# Patient Record
Sex: FEMALE | Race: OTHER | HISPANIC OR LATINO | Employment: FULL TIME | URBAN - NONMETROPOLITAN AREA
[De-identification: names, ages, dates, MRNs, and addresses within clinical notes are randomized per-mention and may not be internally consistent; named-entity substitution may affect disease eponyms.]

---

## 2017-01-16 ENCOUNTER — OFFICE VISIT (OUTPATIENT)
Dept: URGENT CARE | Facility: PHYSICIAN GROUP | Age: 32
End: 2017-01-16
Payer: COMMERCIAL

## 2017-01-16 VITALS
TEMPERATURE: 97.2 F | HEART RATE: 88 BPM | WEIGHT: 240 LBS | BODY MASS INDEX: 42.52 KG/M2 | RESPIRATION RATE: 16 BRPM | SYSTOLIC BLOOD PRESSURE: 122 MMHG | DIASTOLIC BLOOD PRESSURE: 84 MMHG | OXYGEN SATURATION: 96 %

## 2017-01-16 DIAGNOSIS — J22 LOWER RESPIRATORY INFECTION: ICD-10-CM

## 2017-01-16 PROCEDURE — 99214 OFFICE O/P EST MOD 30 MIN: CPT | Performed by: PHYSICIAN ASSISTANT

## 2017-01-16 RX ORDER — CODEINE PHOSPHATE AND GUAIFENESIN 10; 100 MG/5ML; MG/5ML
5 SOLUTION ORAL NIGHTLY PRN
Qty: 120 ML | Refills: 0 | Status: SHIPPED | OUTPATIENT
Start: 2017-01-16 | End: 2021-09-20

## 2017-01-16 NOTE — PROGRESS NOTES
Chief Complaint   Patient presents with   • Other       HISTORY OF PRESENT ILLNESS: Patient is a 31 y.o. female who presents today for evaluation of the followin days h/o nonproductive cough  C/o intermittent chills/sweats, mostly at night  Difficulty sleeping d/t cough  Denies SOB, nasal congestion, ear pain, sore throat, N/V  Ibuprofen seems to make her feel a little better  Exertion makes cough worse    There are no active problems to display for this patient.      Allergies:Review of patient's allergies indicates no known allergies.    Current Outpatient Prescriptions Ordered in [facility]   Medication Sig Dispense Refill   • guaifenesin-codeine (ROBITUSSIN AC) Solution oral solution Take 5 mL by mouth at bedtime as needed for Cough. 120 mL 0   • ondansetron (ZOFRAN) 8 MG Tab Take 1 Tab by mouth every 8 hours as needed for Nausea/Vomiting. 15 Tab 0   • Hydrocortisone Acetate 1 % Ointment Eliane to affected area bid 1 Tube 0   • ondansetron (ZOFRAN) 4 MG TABS tablet Take 1 Tab by mouth every 8 hours as needed for Nausea/Vomiting. 20 Tab 1   • LISINOPRIL PO Take  by mouth.     • naproxen (NAPROSYN) 500 MG TABS Take 1 Tab by mouth 2 times a day, with meals. 60 Tab 0   • clotrimazole (LOTRIMIN AF) 1 % CREA AppliyTwice a day for 2 weeks 1 Tube 0   • cyclobenzaprine (FLEXERIL) 10 MG TABS Take  by mouth as needed for Mild Pain.     • naproxen (NAPROSYN) 500 MG TABS Take  by mouth as needed.       No current Epic-ordered facility-administered medications on file.       No past medical history on file.    Social History   Substance Use Topics   • Smoking status: Current Every Day Smoker   • Smokeless tobacco: Not on file      Comment:  ppd   • Alcohol Use: Yes       No family status information on file.   No family history on file.    ROS:   Review of Systems   Constitutional: Negative for  weight loss and malaise/fatigue.   HENT: Negative for ear pain, nosebleeds, congestion, sore throat and neck pain.    Eyes:  Negative for blurred vision.   Respiratory: Negative for sputum production, shortness of breath and wheezing.    Cardiovascular: Negative for chest pain, palpitations, orthopnea and leg swelling.   Gastrointestinal: Negative for heartburn, nausea, vomiting and abdominal pain.   Genitourinary: Negative for dysuria, urgency and frequency.       Exam:  Blood pressure 122/84, pulse 88, temperature 36.2 °C (97.2 °F), resp. rate 16, weight 108.863 kg (240 lb), SpO2 96 %.  General: Normal appearing. No distress.  HEENT: Conjunctiva clear, lids without ptosis, ears normal shape and contour, canals are clear bilaterally, tympanic membranes are benign, nasal mucosa benign, oropharynx is without erythema, edema or exudates.  Pulmonary: Clear to ausculation and percussion.  Normal effort. No rales, ronchi, or wheezing.   Cardiovascular: Regular rate and rhythm without murmur.   Neurologic: Grossly nonfocal.  Lymph: No cervical lymphadenopathy noted.  Skin: No obvious lesions.  Psych: Normal mood. Alert and oriented x3. Judgment and insight is normal.    Assessment/Plan:  Discussed likely viral etiology. Discussed appropriate over-the-counter symptomatic medication, and when to return to clinic. Take all medication as directed. Follow up for worsening or persistent symptoms.  1. Lower respiratory infection  guaifenesin-codeine (ROBITUSSIN AC) Solution oral solution

## 2017-01-16 NOTE — MR AVS SNAPSHOT
Gina Johnson   2017 10:10 AM   Office Visit   MRN: [id number]    Department:  [facility]   Dept Phone:  [phone]    Description:  Female : 1985   Provider:  Coby Carter PA-C           Reason for Visit     Other           Allergies as of 2017     No Known Allergies      You were diagnosed with     Lower respiratory infection   [828055]         Vital Signs     Blood Pressure Pulse Temperature Respirations Weight Oxygen Saturation    122/84 mmHg 88 36.2 °C (97.2 °F) 16 108.863 kg (240 lb) 96%    Smoking Status                   Current Every Day Smoker           Basic Information     Date Of Birth Sex Race Ethnicity Preferred Language    1985 Female  or   Origin (Kittitian,Scottish,Mexican,Uzair, etc) English      Health Maintenance        Date Due Completion Dates    IMM DTaP/Tdap/Td Vaccine (1 - Tdap) 2003 ---    PAP SMEAR 2005 ---    IMM INFLUENZA (1) 2016 ---            Current Immunizations     No immunizations on file.      Below and/or attached are the medications your provider expects you to take. Review all of your home medications and newly ordered medications with your provider and/or pharmacist. Follow medication instructions as directed by your provider and/or pharmacist. Please keep your medication list with you and share with your provider. Update the information when medications are discontinued, doses are changed, or new medications (including over-the-counter products) are added; and carry medication information at all times in the event of emergency situations     Allergies:  No Known Allergies          Medications  Valid as of: 2017 - 11:13 AM    Generic Name Brand Name Tablet Size Instructions for use    Clotrimazole (Cream) LOTRIMIN 1 % AppliyTwice a day for 2 weeks        Cyclobenzaprine HCl (Tab) FLEXERIL 10 MG Take  by mouth as needed for Mild Pain.        Guaifenesin-Codeine (Solution) ROBITUSSIN  -10 mg/5mL Take 5 mL by mouth at bedtime as needed for Cough.        Hydrocortisone Acetate (Ointment) Hydrocortisone Acetate 1 % Eliane to affected area bid        Lisinopril   Take  by mouth.        Naproxen (Tab) NAPROSYN 500 MG Take  by mouth as needed.        Naproxen (Tab) NAPROSYN 500 MG Take 1 Tab by mouth 2 times a day, with meals.        Ondansetron HCl (Tab) ZOFRAN 4 MG Take 1 Tab by mouth every 8 hours as needed for Nausea/Vomiting.        Ondansetron HCl (Tab) ZOFRAN 8 MG Take 1 Tab by mouth every 8 hours as needed for Nausea/Vomiting.        .                 Medicines prescribed today were sent to:     [facility] [address] [place] [address], [place] [address] [facility] [address] [place] [address] [address]    [address] [facility] [address] [place] [address]    Phone: [phone] Fax: [phone]    Open 24 Hours?: No      Medication refill instructions:       If your prescription bottle indicates you have medication refills left, it is not necessary to call your provider’s office. Please contact your pharmacy and they will refill your medication.    If your prescription bottle indicates you do not have any refills left, you may request refills at any time through one of the following ways: The online [employer] system (except Urgent Care), by calling your provider’s office, or by asking your pharmacy to contact your provider’s office with a refill request. Medication refills are processed only during regular business hours and may not be available until the next business day. Your provider may request additional information or to have a follow-up visit with you prior to refilling your medication.   *Please Note: Medication refills are assigned a new Rx number when refilled electronically. Your pharmacy may indicate that no refills were authorized even though a new prescription for the same medication is available at the pharmacy. Please request the medicine by name with the pharmacy before contacting your provider  for a refill.           [email or web address] Access Code: [email or web address]  Expires: 1/21/2017  9:16 AM    Your email address is not on file at [facility].  Email Addresses are required for you to sign up for [email or web address], please contact [phone] to verify your personal information and to provide your email address prior to attempting to register for [email or web address].    Gina Johnson  [address]  [place] [address]    Windeln.de  A secure, online tool to manage your health information     [facility]’s Windeln.de® is a secure, online tool that connects you to your personalized health information from the privacy of your home -- day or night - making it very easy for you to manage your healthcare. Once the activation process is completed, you can even access your medical information using the Windeln.de eliane, which is available for free in the Apple Eliane store or Google Play store.     To learn more about [email or web address], visit [email or web address]    There are two levels of access available (as shown below):   My Chart Features  [facility] Primary Care Doctor [facility]  Specialists [facility]  Urgent  Care Non-[facility] Primary Care Doctor   Email your healthcare team securely and privately 24/7 X X X    Manage appointments: schedule your next appointment; view details of past/upcoming appointments X      Request prescription refills. X      View recent personal medical records, including lab and immunizations X X X X   View health record, including health history, allergies, medications X X X X   Read reports about your outpatient visits, procedures, consult and ER notes X X X X   See your discharge summary, which is a recap of your hospital and/or ER visit that includes your diagnosis, lab results, and care plan X X  X     How to register for [email or web address].de:  Once your e-mail address has been verified, follow the following steps to sign up for [employer].     1. Go to  [email or web address]  2. Click on the Sign Up Now box, which takes you to the New Member  Sign Up page. You will need to provide the following information:  a. Enter your O[employer] Access Code exactly as it appears at the top of this page. (You will not need to use this code after you’ve completed the sign-up process. If you do not sign up before the expiration date, you must request a new code.)   b. Enter your date of birth.   c. Enter your home email address.   d. Click Submit, and follow the next screen’s instructions.  3. Create a Oligomerix ID. This will be your Oligomerix login ID and cannot be changed, so think of one that is secure and easy to remember.  4. Create a Oligomerix password. You can change your password at any time.  5. Enter your Password Reset Question and Answer. This can be used at a later time if you forget your password.   6. Enter your e-mail address. This allows you to receive e-mail notifications when new information is available in Oligomerix.  7. Click Sign Up. You can now view your health information.    For assistance activating your Oligomerix account, call [phone]

## 2017-01-17 ENCOUNTER — OFFICE VISIT (OUTPATIENT)
Dept: URGENT CARE | Facility: PHYSICIAN GROUP | Age: 32
End: 2017-01-17
Payer: COMMERCIAL

## 2017-01-17 VITALS
TEMPERATURE: 98.5 F | BODY MASS INDEX: 42.52 KG/M2 | SYSTOLIC BLOOD PRESSURE: 142 MMHG | RESPIRATION RATE: 18 BRPM | WEIGHT: 240 LBS | DIASTOLIC BLOOD PRESSURE: 98 MMHG | OXYGEN SATURATION: 98 % | HEART RATE: 76 BPM

## 2017-01-17 DIAGNOSIS — J06.9 ACUTE URI: ICD-10-CM

## 2017-01-17 DIAGNOSIS — J02.9 SORE THROAT: ICD-10-CM

## 2017-01-17 LAB
INT CON NEG: NEGATIVE
INT CON POS: POSITIVE
S PYO AG THROAT QL: NORMAL

## 2017-01-17 PROCEDURE — 87880 STREP A ASSAY W/OPTIC: CPT | Performed by: PHYSICIAN ASSISTANT

## 2017-01-17 PROCEDURE — 99214 OFFICE O/P EST MOD 30 MIN: CPT | Performed by: PHYSICIAN ASSISTANT

## 2017-01-17 RX ORDER — AZITHROMYCIN 250 MG/1
TABLET, FILM COATED ORAL
Qty: 6 TAB | Refills: 0 | Status: SHIPPED | OUTPATIENT
Start: 2017-01-19 | End: 2021-09-20

## 2017-01-17 ASSESSMENT — ENCOUNTER SYMPTOMS
VOMITING: 0
ABDOMINAL PAIN: 0
SWOLLEN GLANDS: 1
PALPITATIONS: 0
MYALGIAS: 0
DIARRHEA: 0
HEADACHES: 1
COUGH: 1
SHORTNESS OF BREATH: 1
TROUBLE SWALLOWING: 1
SORE THROAT: 1
NAUSEA: 0
CHILLS: 0
FEVER: 0
WHEEZING: 0

## 2017-01-17 NOTE — PROGRESS NOTES
Subjective:      Gina Johnson is a 31 y.o. female who presents with Pharyngitis            Pharyngitis   This is a new problem. The current episode started in the past 7 days. The problem has been gradually worsening. The maximum temperature recorded prior to her arrival was 100.4 - 100.9 F. Associated symptoms include congestion, coughing, ear pain, headaches, shortness of breath, swollen glands and trouble swallowing. Pertinent negatives include no abdominal pain, diarrhea or vomiting. She has had exposure to strep. She has tried nothing for the symptoms.   Daughter diagnosed with strep today.    PMH:  has no past medical history on file.  MEDS:   Current outpatient prescriptions:   •  [START ON 1/19/2017] azithromycin (ZITHROMAX) 250 MG Tab, Z-ana, Use as directed., Disp: 6 Tab, Rfl: 0  •  guaifenesin-codeine (ROBITUSSIN AC) Solution oral solution, Take 5 mL by mouth at bedtime as needed for Cough., Disp: 120 mL, Rfl: 0  •  ondansetron (ZOFRAN) 8 MG Tab, Take 1 Tab by mouth every 8 hours as needed for Nausea/Vomiting., Disp: 15 Tab, Rfl: 0  •  Hydrocortisone Acetate 1 % Ointment, Eliane to affected area bid, Disp: 1 Tube, Rfl: 0  •  ondansetron (ZOFRAN) 4 MG TABS tablet, Take 1 Tab by mouth every 8 hours as needed for Nausea/Vomiting., Disp: 20 Tab, Rfl: 1  •  LISINOPRIL PO, Take  by mouth., Disp: , Rfl:   •  naproxen (NAPROSYN) 500 MG TABS, Take 1 Tab by mouth 2 times a day, with meals., Disp: 60 Tab, Rfl: 0  •  clotrimazole (LOTRIMIN AF) 1 % CREA, AppliyTwice a day for 2 weeks, Disp: 1 Tube, Rfl: 0  •  cyclobenzaprine (FLEXERIL) 10 MG TABS, Take  by mouth as needed for Mild Pain., Disp: , Rfl:   •  naproxen (NAPROSYN) 500 MG TABS, Take  by mouth as needed., Disp: , Rfl:   ALLERGIES: No Known Allergies  SURGHX:   Past Surgical History   Procedure Laterality Date   • Other orthopedic surgery       right ankle     SOCHX:  reports that she has been smoking.  She does not have any smokeless tobacco history on file.  She reports that she drinks alcohol. She reports that she does not use illicit drugs.  FH: family history is not on file.      Review of Systems   Constitutional: Negative for fever and chills.   HENT: Positive for congestion, ear pain, sore throat and trouble swallowing.    Respiratory: Positive for cough and shortness of breath. Negative for wheezing.    Cardiovascular: Negative for chest pain and palpitations.   Gastrointestinal: Negative for nausea, vomiting, abdominal pain and diarrhea.   Musculoskeletal: Negative for myalgias.   Neurological: Positive for headaches.       Medications, Allergies, and current problem list reviewed today in Epic       Objective:     /98 mmHg  Pulse 76  Temp(Src) 36.9 °C (98.5 °F)  Resp 18  Wt 108.863 kg (240 lb)  SpO2 98%     Physical Exam   Constitutional: She is oriented to person, place, and time. She appears well-developed and well-nourished. No distress.   HENT:   Head: Normocephalic and atraumatic.   Right Ear: Hearing, tympanic membrane and external ear normal.   Left Ear: Hearing, tympanic membrane and external ear normal.   Nose: Nose normal.   Mouth/Throat: Normal dentition. No dental caries. Posterior oropharyngeal edema and posterior oropharyngeal erythema present. No oropharyngeal exudate.   Eyes: Conjunctivae and EOM are normal. Pupils are equal, round, and reactive to light. Right eye exhibits no discharge. Left eye exhibits no discharge. No scleral icterus.   Neck: Normal range of motion. Neck supple. No tracheal deviation present. No thyromegaly present.   Cardiovascular: Normal rate, regular rhythm and normal heart sounds.    Pulmonary/Chest: Effort normal and breath sounds normal. No respiratory distress. She has no wheezes.   Lymphadenopathy:        Head (right side): Submandibular adenopathy present.        Head (left side): Submandibular adenopathy present.     She has cervical adenopathy.        Right cervical: No superficial cervical adenopathy  present.       Left cervical: No superficial cervical adenopathy present.   Neurological: She is alert and oriented to person, place, and time.   Skin: Skin is warm and dry. She is not diaphoretic. No cyanosis. Nails show no clubbing.   Psychiatric: She has a normal mood and affect. Her behavior is normal. Judgment and thought content normal.   Nursing note and vitals reviewed.              Assessment/Plan:     1. Sore throat  POCT Rapid Strep A   2. Acute URI  azithromycin (ZITHROMAX) 250 MG Tab     Patient was given a contingent antibiotic prescription to fill and use as directed if symptoms progressed as discussed and agreed upon.  Increase fluids and rest  Advil or Tylenol for fever and pain  Warm beverages or Chloraseptic spray    Return to clinic or go to ED if symptoms worsen or persist. Indications for ED discussed at length. Patient voices understanding. Follow-up with your primary care provider in 3-5 days. Red flags discussed.    Please note that this dictation was created using voice recognition software. I have made every reasonable attempt to correct obvious errors, but I expect that there are errors of grammar and possibly content that I did not discover before finalizing the note.

## 2017-01-17 NOTE — MR AVS SNAPSHOT
Gina Johnson   2017 2:00 PM   Office Visit   MRN: [id number]    Department:  [facility]   Dept Phone:  [phone]    Description:  Female : 1985   Provider:  Fortino Georges PA-C           Reason for Visit     Pharyngitis           Allergies as of 2017     No Known Allergies      You were diagnosed with     Sore throat   [949127]       Acute URI   [[id number]]         Vital Signs     Blood Pressure Pulse Temperature Respirations Weight Oxygen Saturation    142/98 mmHg 76 36.9 °C (98.5 °F) 18 108.863 kg (240 lb) 98%    Smoking Status                   Current Every Day Smoker           Basic Information     Date Of Birth Sex Race Ethnicity Preferred Language    1985 Female  or   Origin (Croatian,Guyanese,South African,Faroese, etc) English      Health Maintenance        Date Due Completion Dates    IMM DTaP/Tdap/Td Vaccine (1 - Tdap) 2003 ---    PAP SMEAR 2005 ---    IMM INFLUENZA (1) 2016 ---            Results     POCT Rapid Strep A      Component    Rapid Strep Screen    Neg    Internal Control Positive    Positive    Internal Control Negative    Negative                        Current Immunizations     No immunizations on file.      Below and/or attached are the medications your provider expects you to take. Review all of your home medications and newly ordered medications with your provider and/or pharmacist. Follow medication instructions as directed by your provider and/or pharmacist. Please keep your medication list with you and share with your provider. Update the information when medications are discontinued, doses are changed, or new medications (including over-the-counter products) are added; and carry medication information at all times in the event of emergency situations     Allergies:  No Known Allergies          Medications  Valid as of: 2017 -  2:56 PM    Generic Name Brand Name Tablet Size Instructions for use    Azithromycin (Tab) ZITHROMAX 250 MG Z-ana, Use as directed.        Clotrimazole (Cream) LOTRIMIN 1 % AppliyTwice a day for 2 weeks        Cyclobenzaprine HCl (Tab) FLEXERIL 10 MG Take  by mouth as needed for Mild Pain.        Guaifenesin-Codeine (Solution) ROBITUSSIN -10 mg/5mL Take 5 mL by mouth at bedtime as needed for Cough.        Hydrocortisone Acetate (Ointment) Hydrocortisone Acetate 1 % Eliane to affected area bid        Lisinopril   Take  by mouth.        Naproxen (Tab) NAPROSYN 500 MG Take  by mouth as needed.        Naproxen (Tab) NAPROSYN 500 MG Take 1 Tab by mouth 2 times a day, with meals.        Ondansetron HCl (Tab) ZOFRAN 4 MG Take 1 Tab by mouth every 8 hours as needed for Nausea/Vomiting.        Ondansetron HCl (Tab) ZOFRAN 8 MG Take 1 Tab by mouth every 8 hours as needed for Nausea/Vomiting.        .                 Medicines prescribed today were sent to:     [facility] [address] [place] [address] [address] [facility] [address]  [address] [place] [address] & [place]    [address] [facility] [address] [facility] [address]    Phone: [phone] Fax: [phone]    Open 24 Hours?: No      Medication refill instructions:       If your prescription bottle indicates you have medication refills left, it is not necessary to call your provider’s office. Please contact your pharmacy and they will refill your medication.    If your prescription bottle indicates you do not have any refills left, you may request refills at any time through one of the following ways: The online [employer] system (except Urgent Care), by calling your provider’s office, or by asking your pharmacy to contact your provider’s office with a refill request. Medication refills are processed only during regular business hours and may not be available until the next business day. Your provider may request additional information or to have a follow-up visit with you prior to refilling your medication.   *Please Note: Medication refills are assigned a new Rx  number when refilled electronically. Your pharmacy may indicate that no refills were authorized even though a new prescription for the same medication is available at the pharmacy. Please request the medicine by name with the pharmacy before contacting your provider for a refill.           Cleartrip Access Code: [email or web address]  Expires: 1/21/2017  9:16 AM    Your email address is not on file at [facility].  Email Addresses are required for you to sign up for Cleartrip, please contact [phone] to verify your personal information and to provide your email address prior to attempting to register for Cleartrip.    Gina GUERRA Johnson  [address] [facility] [address]  [place] [address]    Cleartrip  A secure, online tool to manage your health information     [employer]’s Cleartrip® is a secure, online tool that connects you to your personalized health information from the privacy of your home -- day or night - making it very easy for you to manage your healthcare. Once the activation process is completed, you can even access your medical information using the Cleartrip eliane, which is available for free in the Apple Eliane store or Google Play store.     To learn more about Cleartrip, visit [email or web address]    There are two levels of access available (as shown below):   My Chart Features  [facility] Primary Care Doctor [facility]  Specialists [facility]  Urgent  Care Non-[facility] Primary Care Doctor   Email your healthcare team securely and privately 24/7 X X X    Manage appointments: schedule your next appointment; view details of past/upcoming appointments X      Request prescription refills. X      View recent personal medical records, including lab and immunizations X X X X   View health record, including health history, allergies, medications X X X X   Read reports about your outpatient visits, procedures, consult and ER notes X X X X   See your discharge summary, which is a recap of your hospital and/or ER visit that includes your diagnosis, lab  results, and care plan X X  X     How to register for Indix:  Once your e-mail address has been verified, follow the following steps to sign up for Indix.     1. Go to  [email or web address]  2. Click on the Sign Up Now box, which takes you to the New Member Sign Up page. You will need to provide the following information:  a. Enter your Indix Access Code exactly as it appears at the top of this page. (You will not need to use this code after you’ve completed the sign-up process. If you do not sign up before the expiration date, you must request a new code.)   b. Enter your date of birth.   c. Enter your home email address.   d. Click Submit, and follow the next screen’s instructions.  3. Create a Xelor Softwaret ID. This will be your Indix login ID and cannot be changed, so think of one that is secure and easy to remember.  4. Create a Xelor Softwaret password. You can change your password at any time.  5. Enter your Password Reset Question and Answer. This can be used at a later time if you forget your password.   6. Enter your e-mail address. This allows you to receive e-mail notifications when new information is available in Indix.  7. Click Sign Up. You can now view your health information.    For assistance activating your Indix account, call [phone]

## 2017-07-10 ENCOUNTER — OFFICE VISIT (OUTPATIENT)
Dept: URGENT CARE | Facility: PHYSICIAN GROUP | Age: 32
End: 2017-07-10

## 2017-07-10 VITALS
HEIGHT: 63 IN | SYSTOLIC BLOOD PRESSURE: 128 MMHG | HEART RATE: 89 BPM | OXYGEN SATURATION: 99 % | DIASTOLIC BLOOD PRESSURE: 78 MMHG | WEIGHT: 230.6 LBS | TEMPERATURE: 99.9 F | BODY MASS INDEX: 40.86 KG/M2 | RESPIRATION RATE: 16 BRPM

## 2017-07-10 DIAGNOSIS — J02.0 STREP THROAT: ICD-10-CM

## 2017-07-10 DIAGNOSIS — J03.90 EXUDATIVE TONSILLITIS: ICD-10-CM

## 2017-07-10 LAB
INT CON NEG: NEGATIVE
INT CON POS: POSITIVE
S PYO AG THROAT QL: POSITIVE

## 2017-07-10 PROCEDURE — 87880 STREP A ASSAY W/OPTIC: CPT | Performed by: PHYSICIAN ASSISTANT

## 2017-07-10 PROCEDURE — 99214 OFFICE O/P EST MOD 30 MIN: CPT | Performed by: PHYSICIAN ASSISTANT

## 2017-07-10 RX ORDER — AMOXICILLIN 500 MG/1
500 CAPSULE ORAL 2 TIMES DAILY
Qty: 20 CAP | Refills: 0 | Status: SHIPPED | OUTPATIENT
Start: 2017-07-10 | End: 2017-07-20

## 2017-07-10 ASSESSMENT — PATIENT HEALTH QUESTIONNAIRE - PHQ9: CLINICAL INTERPRETATION OF PHQ2 SCORE: 0

## 2017-07-10 ASSESSMENT — PAIN SCALES - GENERAL: PAINLEVEL: 10=SEVERE PAIN

## 2017-07-10 NOTE — Clinical Note
July 10, 2017         Patient: Gina Johnson   YOB: 1985   Date of Visit: 7/10/2017           To Whom it May Concern:    Gina Johnson was seen in my clinic on 7/10/2017. She may return to work on 7/11/17.    If you have any questions or concerns, please don't hesitate to call.        Sincerely,           Coby Carter PA-C  Electronically Signed

## 2017-07-10 NOTE — MR AVS SNAPSHOT
"        Gina Johnson   7/10/2017 1:05 PM   Office Visit   MRN: [id number]    Department:  [facility]   Dept Phone:  [phone]    Description:  Female : 1985   Provider:  Coby Carter PA-C           Reason for Visit     Pharyngitis her sister kids have strep    Generalized Body Aches     Chills           Allergies as of 7/10/2017     No Known Allergies      You were diagnosed with     Exudative tonsillitis   [1051876]       Strep throat   [706543]         Vital Signs     Blood Pressure Pulse Temperature Respirations Height Weight    128/78 mmHg 89 37.7 °C (99.9 °F) 16 1.6 m (5' 2.99\") 104.599 kg (230 lb 9.6 oz)    Body Mass Index Oxygen Saturation Last Menstrual Period Smoking Status          40.86 kg/m2 99% 2017 Current Every Day Smoker        Basic Information     Date Of Birth Sex Race Ethnicity Preferred Language    1985 Female  or   Origin (Azerbaijani,Senegalese,South Korean,Argentine, etc) English      Health Maintenance        Date Due Completion Dates    IMM DTaP/Tdap/Td Vaccine (1 - Tdap) 2003 ---    PAP SMEAR 2005 ---    IMM INFLUENZA (1) 2017 ---            Results     POCT Rapid Strep A      Component    Rapid Strep Screen    positive    Internal Control Positive    Positive    Internal Control Negative    Negative                        Current Immunizations     No immunizations on file.      Below and/or attached are the medications your provider expects you to take. Review all of your home medications and newly ordered medications with your provider and/or pharmacist. Follow medication instructions as directed by your provider and/or pharmacist. Please keep your medication list with you and share with your provider. Update the information when medications are discontinued, doses are changed, or new medications (including over-the-counter products) are added; and carry medication information at all times in the event of emergency situations    "    Allergies:  No Known Allergies          Medications  Valid as of: July 10, 2017 -  1:53 PM    Generic Name Brand Name Tablet Size Instructions for use    Amoxicillin (Cap) AMOXIL 500 MG Take 1 Cap by mouth 2 times a day for 10 days.        Azithromycin (Tab) ZITHROMAX 250 MG Z-ana, Use as directed.        Clotrimazole (Cream) LOTRIMIN 1 % AppliyTwice a day for 2 weeks        Cyclobenzaprine HCl (Tab) FLEXERIL 10 MG Take  by mouth as needed for Mild Pain.        Guaifenesin-Codeine (Solution) ROBITUSSIN -10 mg/5mL Take 5 mL by mouth at bedtime as needed for Cough.        Hydrocortisone Acetate (Ointment) Hydrocortisone Acetate 1 % Eliane to affected area bid        Lisinopril   Take  by mouth.        Naproxen (Tab) NAPROSYN 500 MG Take  by mouth as needed.        Naproxen (Tab) NAPROSYN 500 MG Take 1 Tab by mouth 2 times a day, with meals.        Ondansetron HCl (Tab) ZOFRAN 4 MG Take 1 Tab by mouth every 8 hours as needed for Nausea/Vomiting.        Ondansetron HCl (Tab) ZOFRAN 8 MG Take 1 Tab by mouth every 8 hours as needed for Nausea/Vomiting.        .                 Medicines prescribed today were sent to:     [facility] [address] [place], [place] [address]  AT [place] [address] & [address] [facility] [address]    Phone: [phone] Fax: [phone]    Open 24 Hours?: No      Medication refill instructions:       If your prescription bottle indicates you have medication refills left, it is not necessary to call your provider’s office. Please contact your pharmacy and they will refill your medication.    If your prescription bottle indicates you do not have any refills left, you may request refills at any time through one of the following ways: The online KOWN system (except Urgent Care), by calling your provider’s office, or by asking your pharmacy to contact your provider’s office with a refill request. Medication refills are processed only during regular  business hours and may not be available until the next business day. Your provider may request additional information or to have a follow-up visit with you prior to refilling your medication.   *Please Note: Medication refills are assigned a new Rx number when refilled electronically. Your pharmacy may indicate that no refills were authorized even though a new prescription for the same medication is available at the pharmacy. Please request the medicine by name with the pharmacy before contacting your provider for a refill.           [employer] Access Code: [email or web address]  Expires: 8/9/2017  1:53 PM    Your email address is not on file at [facility].  Email Addresses are required for you to sign up for [employer], please contact [phone] to verify your personal information and to provide your email address prior to attempting to register for [employer].    Gina Johnson  [address] [place], [place] [address] [address]    [employer]  A secure, online tool to manage your health information     [facility]’s [employer]® is a secure, online tool that connects you to your personalized health information from the privacy of your home -- day or night - making it very easy for you to manage your healthcare. Once the activation process is completed, you can even access your medical information using the [employer] eliane, which is available for free in the Apple Eliane store or Google Play store.     To learn more about [employer], visit [email or web address]    There are two levels of access available (as shown below):   My Chart Features  [facility] Primary Care Doctor [facility]  Specialists [facility]  Urgent  Care Non-[facility] Primary Care Doctor   Email your healthcare team securely and privately 24/7 X X X    Manage appointments: schedule your next appointment; view details of past/upcoming appointments X      Request prescription refills. X      View recent personal medical records, including lab and immunizations X X X X   View health record, including  health history, allergies, medications X X X X   Read reports about your outpatient visits, procedures, consult and ER notes X X X X   See your discharge summary, which is a recap of your hospital and/or ER visit that includes your diagnosis, lab results, and care plan X X  X     How to register for [employer]:  Once your e-mail address has been verified, follow the following steps to sign up for [employer].     1. Go to  [email or web address]  2. Click on the Sign Up Now box, which takes you to the New Member Sign Up page. You will need to provide the following information:  a. Enter your [employer] Access Code exactly as it appears at the top of this page. (You will not need to use this code after you’ve completed the sign-up process. If you do not sign up before the expiration date, you must request a new code.)   b. Enter your date of birth.   c. Enter your home email address.   d. Click Submit, and follow the next screen’s instructions.  3. Create a [employer] Friend ID. This will be your [employer] Friend login ID and cannot be changed, so think of one that is secure and easy to remember.  4. Create a [employer] password. You can change your password at any time.  5. Enter your Password Reset Question and Answer. This can be used at a later time if you forget your password.   6. Enter your e-mail address. This allows you to receive e-mail notifications when new information is available in [employer].  7. Click Sign Up. You can now view your health information.    For assistance activating your [employer] account, call [phone]         Quit Tobacco Information     Do you want to quit using tobacco?    Quitting tobacco decreases risks of cancer, heart and lung disease, increases life expectancy, improves sense of taste and smell, and increases spending money, among other benefits.    If you are thinking about quitting, we can help.  • [facility] Quit Tobacco Program: 635.906.5884  o Program occurs weekly for four weeks and includes pharmacist  consultation on products to support quitting smoking or chewing tobacco. A provider referral is needed for pharmacist consultation.  • Tobacco Users Help Hotline: 5-800QUIT-NOW (075-2414) or https://[place].quitlogix.org/  o Free, confidential telephone and online coaching for [place] residents. Sessions are designed on a schedule that is convenient for you. Eligible clients receive free nicotine replacement therapy.  • Nationally: www.smokefree.gov  o Information and professional assistance to support both immediate and long-term needs as you become, and remain, a non-smoker. Smokefree.gov allows you to choose the help that best fits your needs.

## 2017-07-10 NOTE — PROGRESS NOTES
Chief Complaint   Patient presents with   • Pharyngitis     her sister kids have strep   • Generalized Body Aches   • Chills       HISTORY OF PRESENT ILLNESS: Patient is a 32 y.o. female who presents today for evaluation of a sore throat starting yesterday. Patient reports subjective fever and difficulty swallowing because of the pain. Patient had exposure to her sister's children who visit positive for strep over the weekend. Patient denies ear pain, cough, and overt fever. She has not taken any medication today. She has no history of mono.    There are no active problems to display for this patient.      Allergies:Review of patient's allergies indicates no known allergies.    Current Outpatient Prescriptions Ordered in [facility]   Medication Sig Dispense Refill   • LISINOPRIL PO Take  by mouth.     • azithromycin (ZITHROMAX) 250 MG Tab Z-ana, Use as directed. 6 Tab 0   • guaifenesin-codeine (ROBITUSSIN AC) Solution oral solution Take 5 mL by mouth at bedtime as needed for Cough. 120 mL 0   • ondansetron (ZOFRAN) 8 MG Tab Take 1 Tab by mouth every 8 hours as needed for Nausea/Vomiting. 15 Tab 0   • Hydrocortisone Acetate 1 % Ointment Eliane to affected area bid 1 Tube 0   • ondansetron (ZOFRAN) 4 MG TABS tablet Take 1 Tab by mouth every 8 hours as needed for Nausea/Vomiting. 20 Tab 1   • naproxen (NAPROSYN) 500 MG TABS Take 1 Tab by mouth 2 times a day, with meals. 60 Tab 0   • clotrimazole (LOTRIMIN AF) 1 % CREA AppliyTwice a day for 2 weeks 1 Tube 0   • cyclobenzaprine (FLEXERIL) 10 MG TABS Take  by mouth as needed for Mild Pain.     • naproxen (NAPROSYN) 500 MG TABS Take  by mouth as needed.       No current Epic-ordered facility-administered medications on file.       History reviewed. No pertinent past medical history.    Social History   Substance Use Topics   • Smoking status: Current Every Day Smoker -- 0.25 packs/day     Types: Cigarettes   • Smokeless tobacco: Never Used      Comment: 1/2 ppd   • Alcohol Use: Yes  "      No family status information on file.   History reviewed. No pertinent family history.    ROS:   Review of Systems   Constitutional: Negative for weight loss and malaise/fatigue.   HENT: Negative for ear pain, nosebleeds,  and neck pain.    Eyes: Negative for blurred vision.   Respiratory: Negative for cough, sputum production, shortness of breath and wheezing.    Cardiovascular: Negative for chest pain, palpitations, orthopnea and leg swelling.   Gastrointestinal: Negative for heartburn, nausea, vomiting and abdominal pain.   Genitourinary: Negative for dysuria, urgency and frequency.       Exam:  Blood pressure 128/78, pulse 89, temperature 37.7 °C (99.9 °F), resp. rate 16, height 1.6 m (5' 2.99\"), weight 104.599 kg (230 lb 9.6 oz), last menstrual period 06/14/2017, SpO2 99 %.  General: Normal appearing. No distress.  HEENT: Conjunctiva clear, lids without ptosis, ears normal shape and contour, canals are clear bilaterally, tympanic membranes are benign, nasal mucosa benign, oropharynx is with mild erythema, moderate edema and mild exudates.  Pulmonary: Clear to ausculation and percussion.  Normal effort. No rales, ronchi, or wheezing.   Cardiovascular: Regular rate and rhythm without murmur.   Neurologic: Grossly nonfocal.  Lymph: Tender anterior cervical lymphadenopathy noted bilaterally.  Skin: No obvious lesions.  Psych: Normal mood. Alert and oriented x3. Judgment and insight is normal.    Rapid strep: Positive    Assessment/Plan  Take all medication as directed. Discussed appropriate over-the-counter symptomatic medication for pain. Follow-up worsening or persistent symptoms.  1. Exudative tonsillitis  POCT Rapid Strep A   2. Strep throat  amoxicillin (AMOXIL) 500 MG Cap       "

## 2017-12-15 ENCOUNTER — OFFICE VISIT (OUTPATIENT)
Dept: URGENT CARE | Facility: CLINIC | Age: 32
End: 2017-12-15

## 2017-12-15 VITALS
HEART RATE: 60 BPM | SYSTOLIC BLOOD PRESSURE: 106 MMHG | RESPIRATION RATE: 14 BRPM | BODY MASS INDEX: 38.27 KG/M2 | OXYGEN SATURATION: 98 % | WEIGHT: 216 LBS | DIASTOLIC BLOOD PRESSURE: 68 MMHG | HEIGHT: 63 IN | TEMPERATURE: 97.8 F

## 2017-12-15 DIAGNOSIS — Z02.1 PHYSICAL EXAM, PRE-EMPLOYMENT: ICD-10-CM

## 2017-12-15 PROCEDURE — 8915 PR COMPREHENSIVE PHYSICAL: Performed by: PHYSICIAN ASSISTANT

## 2017-12-15 PROCEDURE — 8907 PR URINE COLLECT ONLY: Performed by: PHYSICIAN ASSISTANT

## 2017-12-15 NOTE — PROGRESS NOTES
See scanned Pre-employment physical and health questionnaire. Unremarkable PMH/FH. No disqualifying medications. Exam normal.

## 2018-01-01 ENCOUNTER — OFFICE VISIT (OUTPATIENT)
Dept: URGENT CARE | Facility: PHYSICIAN GROUP | Age: 33
End: 2018-01-01

## 2018-01-01 VITALS
TEMPERATURE: 98.9 F | WEIGHT: 212 LBS | HEART RATE: 112 BPM | HEIGHT: 63 IN | DIASTOLIC BLOOD PRESSURE: 84 MMHG | SYSTOLIC BLOOD PRESSURE: 126 MMHG | RESPIRATION RATE: 16 BRPM | OXYGEN SATURATION: 96 % | BODY MASS INDEX: 37.56 KG/M2

## 2018-01-01 DIAGNOSIS — J02.0 STREP THROAT: ICD-10-CM

## 2018-01-01 DIAGNOSIS — J02.9 PHARYNGITIS, UNSPECIFIED ETIOLOGY: ICD-10-CM

## 2018-01-01 LAB
INT CON NEG: NEGATIVE
INT CON POS: POSITIVE
S PYO AG THROAT QL: POSITIVE

## 2018-01-01 PROCEDURE — 99214 OFFICE O/P EST MOD 30 MIN: CPT | Performed by: NURSE PRACTITIONER

## 2018-01-01 PROCEDURE — 87880 STREP A ASSAY W/OPTIC: CPT | Performed by: NURSE PRACTITIONER

## 2018-01-01 RX ORDER — AMOXICILLIN 500 MG/1
500 CAPSULE ORAL 2 TIMES DAILY
Qty: 20 CAP | Refills: 0 | Status: SHIPPED | OUTPATIENT
Start: 2018-01-01 | End: 2018-01-11

## 2018-01-01 ASSESSMENT — ENCOUNTER SYMPTOMS
SINUS PAIN: 0
COUGH: 0
CHILLS: 0
DIAPHORESIS: 0
SORE THROAT: 1
WEAKNESS: 0
FEVER: 0
MYALGIAS: 0

## 2018-01-01 NOTE — PROGRESS NOTES
"Subjective:      Gina Padilla is a 32 y.o. female who presents with Pharyngitis            Patient comes in today with a 3 day history of pharyngitis.  She denies any other URI symptoms.  She has not taken any medication to treat the symptoms.  She has had strep throat several times this year and reports it feels the same.          Review of Systems   Constitutional: Negative for chills, diaphoresis, fever and malaise/fatigue.   HENT: Positive for sore throat. Negative for congestion, ear pain and sinus pain.    Respiratory: Negative for cough.    Musculoskeletal: Negative for myalgias.   Neurological: Negative for weakness.     Medications, Allergies, and current problem list reviewed today in Epic     Objective:     /84   Pulse (!) 112   Temp 37.2 °C (98.9 °F)   Resp 16   Ht 1.6 m (5' 3\")   Wt 96.2 kg (212 lb)   SpO2 96%   BMI 37.55 kg/m²      Physical Exam   Constitutional: She is oriented to person, place, and time. She appears well-developed and well-nourished. No distress.   HENT:   Head: Normocephalic.   Right Ear: External ear normal.   Left Ear: External ear normal.   Nose: Nose normal.   Mouth/Throat: No oropharyngeal exudate.   Diffuse oropharyngeal erythema with 1+ bilateral tonsil enlargement.  No exudate or edema.  Uvula midline.     Eyes: Conjunctivae are normal. Pupils are equal, round, and reactive to light. Right eye exhibits no discharge. Left eye exhibits no discharge. No scleral icterus.   Neck: Neck supple. No JVD present. No tracheal deviation present. No thyromegaly present.   Cardiovascular: Regular rhythm and normal heart sounds.  Exam reveals no gallop and no friction rub.    No murmur heard.  Tachycardia noted.   Pulmonary/Chest: Effort normal and breath sounds normal. No stridor. No respiratory distress. She has no wheezes. She has no rales. She exhibits no tenderness.   Musculoskeletal: She exhibits no edema.   Lymphadenopathy:     She has no cervical " adenopathy.   Neurological: She is alert and oriented to person, place, and time.   Skin: Skin is warm and dry. No rash noted. She is not diaphoretic. No erythema.   Vitals reviewed.    POCT rapid strep a: positive.            Assessment/Plan:     1. Strep throat  - amoxicillin (AMOXIL) 500 MG Cap; Take 1 Cap by mouth 2 times a day for 10 days.  Dispense: 20 Cap; Refill: 0    2. Pharyngitis, unspecified etiology  - POCT Rapid Strep A    Take full course of antibiotics.  OTC NSAIDs or tylenol prn fever, pain.  OTC throat sprays or lozenges prn symptom management.  Maintain adequate po hydration.  RTC in 5-7 days if symptoms persist, sooner if worse.  Patient verbalized understanding of and agreed with plan of care.

## 2018-05-17 ENCOUNTER — OFFICE VISIT (OUTPATIENT)
Dept: URGENT CARE | Facility: PHYSICIAN GROUP | Age: 33
End: 2018-05-17
Payer: COMMERCIAL

## 2018-05-17 VITALS
SYSTOLIC BLOOD PRESSURE: 118 MMHG | BODY MASS INDEX: 37.39 KG/M2 | TEMPERATURE: 97.9 F | HEART RATE: 95 BPM | OXYGEN SATURATION: 98 % | WEIGHT: 219 LBS | DIASTOLIC BLOOD PRESSURE: 78 MMHG | RESPIRATION RATE: 16 BRPM | HEIGHT: 64 IN

## 2018-05-17 DIAGNOSIS — K52.9 GASTROENTERITIS: ICD-10-CM

## 2018-05-17 PROCEDURE — 99214 OFFICE O/P EST MOD 30 MIN: CPT | Performed by: NURSE PRACTITIONER

## 2018-05-17 ASSESSMENT — ENCOUNTER SYMPTOMS
DIARRHEA: 1
VOMITING: 1

## 2018-05-17 NOTE — PROGRESS NOTES
"Subjective:      Gina Padilla is a 33 y.o. female who presents with Vomiting and Diarrhea     History reviewed. No pertinent past medical history.  Social History     Social History   • Marital status: Single     Spouse name: N/A   • Number of children: N/A   • Years of education: N/A     Occupational History   • Not on file.     Social History Main Topics   • Smoking status: Current Every Day Smoker     Packs/day: 0.25     Types: Cigarettes   • Smokeless tobacco: Never Used      Comment: 1/2 ppd   • Alcohol use Yes      Comment: Occasionally   • Drug use: No   • Sexual activity: Not on file     Other Topics Concern   • Not on file     Social History Narrative   • No narrative on file     History reviewed. No pertinent family history.    Allergies: Patient has no known allergies.    Patient is a 33-year-old female who presents with complaint of intermittent abdominal cramping with multiple episodes of diarrhea and vomiting starting today. Symptoms started within the last 48 hours. Her daughter has had diarrhea recently but that has resolved. No other suspect food intake or recent travel. No antibiotic use within the last 3 months. Patient states she is concerned because the allotted eggs and she is worried about having contracted Salmonella.            Vomiting   This is a new problem. The current episode started in the past 7 days. The problem has been unchanged. Associated symptoms include vomiting. Associated symptoms comments: Diarrhea  . Nothing aggravates the symptoms. She has tried nothing for the symptoms. The treatment provided no relief.       Review of Systems   Constitutional: Positive for malaise/fatigue.   Gastrointestinal: Positive for diarrhea and vomiting.   All other systems reviewed and are negative.         Objective:     /78   Pulse 95   Temp 36.6 °C (97.9 °F)   Resp 16   Ht 1.626 m (5' 4\")   Wt 99.3 kg (219 lb)   SpO2 98%   BMI 37.59 kg/m²      Physical Exam "   Constitutional: She is oriented to person, place, and time. She appears well-developed and well-nourished.   HENT:   Mouth/Throat: Oropharynx is clear and moist.   Eyes: Conjunctivae and EOM are normal. Pupils are equal, round, and reactive to light.   Neck: Normal range of motion. Neck supple.   Cardiovascular: Normal rate and regular rhythm.    Pulmonary/Chest: Effort normal.   Abdominal: Soft. She exhibits no distension and no mass. There is tenderness. There is no rebound and no guarding. No hernia.   There is mild tenderness across the lower abdomen without guarding or rebound tenderness.   Neurological: She is alert and oriented to person, place, and time.   Skin: Skin is warm and dry. Capillary refill takes less than 2 seconds.   Psychiatric: She has a normal mood and affect. Her behavior is normal. Judgment and thought content normal.   Vitals reviewed.              Assessment/Plan:     1. Gastroenteritis  Zofran IM given  -RX for zofran odt  -imodium as needed  -push electrolyte fluids  -bland diet  -ER precautions: intractable vomiting/diarrhea, developing abdominal pain, fever >100.5   -stool culture ordere

## 2018-05-17 NOTE — LETTER
May 17, 2018       Patient: Gina Padilla   YOB: 1985   Date of Visit: 5/17/2018         To Whom It May Concern:    It is my medical opinion that Gina Padilla may return to work on 5/19/18.    If you have any questions or concerns, please don't hesitate to call [phone]          Sincerely,          Cathey J Hamman, A.P.N.  Electronically Signed

## 2018-09-05 ENCOUNTER — NON-PROVIDER VISIT (OUTPATIENT)
Dept: URGENT CARE | Facility: PHYSICIAN GROUP | Age: 33
End: 2018-09-05

## 2018-09-05 DIAGNOSIS — Z02.1 PRE-EMPLOYMENT DRUG SCREENING: ICD-10-CM

## 2018-09-05 LAB
AMP AMPHETAMINE: NORMAL
COC COCAINE: NORMAL
INT CON NEG: NORMAL
INT CON POS: NORMAL
MET METHAMPHETAMINES: NORMAL
OPI OPIATES: NORMAL
PCP PHENCYCLIDINE: NORMAL
POC DRUG COMMENT 753798-OCCUPATIONAL HEALTH: NEGATIVE
THC: NORMAL

## 2018-09-05 PROCEDURE — 80305 DRUG TEST PRSMV DIR OPT OBS: CPT | Performed by: PHYSICIAN ASSISTANT

## 2019-03-03 ENCOUNTER — OFFICE VISIT (OUTPATIENT)
Dept: URGENT CARE | Facility: PHYSICIAN GROUP | Age: 34
End: 2019-03-03
Payer: COMMERCIAL

## 2019-03-03 VITALS
OXYGEN SATURATION: 98 % | HEART RATE: 80 BPM | BODY MASS INDEX: 36.56 KG/M2 | TEMPERATURE: 98.8 F | WEIGHT: 213 LBS | RESPIRATION RATE: 16 BRPM

## 2019-03-03 DIAGNOSIS — H65.93 FLUID LEVEL BEHIND TYMPANIC MEMBRANE OF BOTH EARS: ICD-10-CM

## 2019-03-03 DIAGNOSIS — J00 ACUTE RHINITIS: ICD-10-CM

## 2019-03-03 DIAGNOSIS — H92.01 OTALGIA OF RIGHT EAR: ICD-10-CM

## 2019-03-03 PROCEDURE — 99214 OFFICE O/P EST MOD 30 MIN: CPT | Performed by: PHYSICIAN ASSISTANT

## 2019-03-03 RX ORDER — AMOXICILLIN 875 MG/1
875 TABLET, COATED ORAL 2 TIMES DAILY
Qty: 20 TAB | Refills: 0 | Status: SHIPPED | OUTPATIENT
Start: 2019-03-03 | End: 2019-03-13

## 2019-03-03 RX ORDER — METHYLPREDNISOLONE 4 MG/1
TABLET ORAL
Qty: 21 TAB | Refills: 0 | Status: SHIPPED | OUTPATIENT
Start: 2019-03-03 | End: 2021-09-20

## 2019-03-03 NOTE — PROGRESS NOTES
Chief Complaint   Patient presents with   • Otalgia     R ear started today       HISTORY OF PRESENT ILLNESS: Patient is a 34 y.o. female who presents today for the following:    Right ear pain started this morning  + mild cough  Denies fever, nasal congestion, drainage, difficulty hearing  Aleve just PTA    There are no active problems to display for this patient.      Allergies:Patient has no known allergies.    Current Outpatient Prescriptions Ordered in [facility]   Medication Sig Dispense Refill   • MethylPREDNISolone (MEDROL DOSEPAK) 4 MG Tablet Therapy Pack Use as package directs 21 Tab 0   • amoxicillin (AMOXIL) 875 MG tablet Take 1 Tab by mouth 2 times a day for 10 days. 20 Tab 0   • azithromycin (ZITHROMAX) 250 MG Tab Z-ana, Use as directed. (Patient not taking: Reported on 3/3/2019) 6 Tab 0   • guaifenesin-codeine (ROBITUSSIN AC) Solution oral solution Take 5 mL by mouth at bedtime as needed for Cough. (Patient not taking: Reported on 3/3/2019) 120 mL 0   • ondansetron (ZOFRAN) 8 MG Tab Take 1 Tab by mouth every 8 hours as needed for Nausea/Vomiting. (Patient not taking: Reported on 3/3/2019) 15 Tab 0   • Hydrocortisone Acetate 1 % Ointment Eliane to affected area bid (Patient not taking: Reported on 3/3/2019) 1 Tube 0   • ondansetron (ZOFRAN) 4 MG TABS tablet Take 1 Tab by mouth every 8 hours as needed for Nausea/Vomiting. (Patient not taking: Reported on 3/3/2019) 20 Tab 1   • LISINOPRIL PO Take  by mouth.     • naproxen (NAPROSYN) 500 MG TABS Take 1 Tab by mouth 2 times a day, with meals. (Patient not taking: Reported on 3/3/2019) 60 Tab 0   • clotrimazole (LOTRIMIN AF) 1 % CREA AppliyTwice a day for 2 weeks (Patient not taking: Reported on 3/3/2019) 1 Tube 0   • cyclobenzaprine (FLEXERIL) 10 MG TABS Take  by mouth as needed for Mild Pain.     • naproxen (NAPROSYN) 500 MG TABS Take  by mouth as needed.       No current Epic-ordered facility-administered medications on file.        No past medical history on  file.    Social History   Substance Use Topics   • Smoking status: Current Every Day Smoker     Packs/day: 0.25     Types: Cigarettes   • Smokeless tobacco: Never Used      Comment: 1/2 ppd   • Alcohol use Yes      Comment: Occasionally       No family status information on file.   No family history on file.    Review of Systems:   Constitutional ROS: No unexpected change in weight, No weakness, No fatigue  Eye ROS: No recent significant change in vision, No eye pain, redness, discharge  Ear ROS: No drainage, No tinnitus or vertigo, No recent change in hearing  Mouth/Throat ROS: No teeth or gum problems, No bleeding gums, No tongue complaints  Neck ROS: No swollen glands, No significant pain in neck  Pulmonary ROS: No chronic cough, sputum, or hemoptysis, No dyspnea on exertion, No wheezing  Cardiovascular ROS: No diaphoresis, No edema, No palpitations  Gastrointestinal ROS: No change in bowel habits, No significant change in appetite, No nausea, vomiting, diarrhea, or constipation  Musculoskeletal/Extremities ROS: No peripheral edema, No pain, redness or swelling on the joints  Hematologic/Lymphatic ROS: No chills, No night sweats, No weight loss  Skin/Integumentary ROS: No edema, No evidence of rash, No itching      Exam:  Pulse 80, temperature 37.1 °C (98.8 °F), temperature source Temporal, resp. rate 16, weight 96.6 kg (213 lb), SpO2 98 %.  General: Well developed, well nourished. No distress.  Eye: PERRL/EOMI; conjunctivae clear, lids normal.  ENMT: Lips without lesions, MMM. Oropharynx is clear. Bilateral TMs are with mild erythema but with clear fluid posterior bilaterally and bulging, right significantly worse than left.  Nasal mucosa is markedly edematous bilaterally, right worse than left.   Pulmonary: Unlabored respiratory effort. Lungs clear to auscultation, no wheezes, no rhonchi.  Cardiovascular: Regular rate and rhythm without murmur.    Neurologic: Grossly nonfocal. No facial asymmetry noted.  Lymph:  No cervical lymphadenopathy noted.  Skin: Warm, dry, good turgor. No rashes in visible areas.   Psych: Normal mood. Alert and oriented x3. Judgment and insight is normal.    Assessment/Plan:  Discussed likely viral etiology versus allergies.  Use all medication as directed.  Discussed appropriate over-the-counter symptomatic medication, and when to return to clinic. Follow up for worsening or persistent symptoms. Contingent antibiotic prescription given to patient to fill upon meeting criteria of guidelines discussed.   1. Otalgia of right ear  amoxicillin (AMOXIL) 875 MG tablet   2. Fluid level behind tympanic membrane of both ears     3. Acute rhinitis  MethylPREDNISolone (MEDROL DOSEPAK) 4 MG Tablet Therapy Pack

## 2019-06-06 ENCOUNTER — HOSPITAL ENCOUNTER (OUTPATIENT)
Dept: LAB | Facility: MEDICAL CENTER | Age: 34
End: 2019-06-06
Attending: FAMILY MEDICINE
Payer: COMMERCIAL

## 2019-06-06 LAB
25(OH)D3 SERPL-MCNC: 15 NG/ML (ref 30–100)
ALBUMIN SERPL BCP-MCNC: 4 G/DL (ref 3.2–4.9)
ALBUMIN/GLOB SERPL: 1.4 G/DL
ALP SERPL-CCNC: 87 U/L (ref 30–99)
ALT SERPL-CCNC: 10 U/L (ref 2–50)
ANION GAP SERPL CALC-SCNC: 7 MMOL/L (ref 0–11.9)
AST SERPL-CCNC: 13 U/L (ref 12–45)
BASOPHILS # BLD AUTO: 0.8 % (ref 0–1.8)
BASOPHILS # BLD: 0.06 K/UL (ref 0–0.12)
BILIRUB SERPL-MCNC: 0.5 MG/DL (ref 0.1–1.5)
BUN SERPL-MCNC: 14 MG/DL (ref 8–22)
CALCIUM SERPL-MCNC: 9.2 MG/DL (ref 8.5–10.5)
CHLORIDE SERPL-SCNC: 101 MMOL/L (ref 96–112)
CHOLEST SERPL-MCNC: 131 MG/DL (ref 100–199)
CO2 SERPL-SCNC: 29 MMOL/L (ref 20–33)
CREAT SERPL-MCNC: 0.54 MG/DL (ref 0.5–1.4)
EOSINOPHIL # BLD AUTO: 0.2 K/UL (ref 0–0.51)
EOSINOPHIL NFR BLD: 2.6 % (ref 0–6.9)
ERYTHROCYTE [DISTWIDTH] IN BLOOD BY AUTOMATED COUNT: 40 FL (ref 35.9–50)
FASTING STATUS PATIENT QL REPORTED: NORMAL
GLOBULIN SER CALC-MCNC: 2.9 G/DL (ref 1.9–3.5)
GLUCOSE SERPL-MCNC: 79 MG/DL (ref 65–99)
HBV CORE AB SERPL QL IA: NEGATIVE
HCT VFR BLD AUTO: 39.4 % (ref 37–47)
HCV AB SER QL: NEGATIVE
HDLC SERPL-MCNC: 44 MG/DL
HGB BLD-MCNC: 13 G/DL (ref 12–16)
IMM GRANULOCYTES # BLD AUTO: 0.01 K/UL (ref 0–0.11)
IMM GRANULOCYTES NFR BLD AUTO: 0.1 % (ref 0–0.9)
LDLC SERPL CALC-MCNC: 75 MG/DL
LYMPHOCYTES # BLD AUTO: 2.7 K/UL (ref 1–4.8)
LYMPHOCYTES NFR BLD: 34.8 % (ref 22–41)
MCH RBC QN AUTO: 29.6 PG (ref 27–33)
MCHC RBC AUTO-ENTMCNC: 33 G/DL (ref 33.6–35)
MCV RBC AUTO: 89.7 FL (ref 81.4–97.8)
MONOCYTES # BLD AUTO: 0.39 K/UL (ref 0–0.85)
MONOCYTES NFR BLD AUTO: 5 % (ref 0–13.4)
NEUTROPHILS # BLD AUTO: 4.4 K/UL (ref 2–7.15)
NEUTROPHILS NFR BLD: 56.7 % (ref 44–72)
NRBC # BLD AUTO: 0 K/UL
NRBC BLD-RTO: 0 /100 WBC
PLATELET # BLD AUTO: 234 K/UL (ref 164–446)
PMV BLD AUTO: 11.8 FL (ref 9–12.9)
POTASSIUM SERPL-SCNC: 3.1 MMOL/L (ref 3.6–5.5)
PROT SERPL-MCNC: 6.9 G/DL (ref 6–8.2)
RBC # BLD AUTO: 4.39 M/UL (ref 4.2–5.4)
SODIUM SERPL-SCNC: 137 MMOL/L (ref 135–145)
TRIGL SERPL-MCNC: 60 MG/DL (ref 0–149)
WBC # BLD AUTO: 7.8 K/UL (ref 4.8–10.8)

## 2019-06-06 PROCEDURE — 80053 COMPREHEN METABOLIC PANEL: CPT

## 2019-06-06 PROCEDURE — 86704 HEP B CORE ANTIBODY TOTAL: CPT

## 2019-06-06 PROCEDURE — 82306 VITAMIN D 25 HYDROXY: CPT

## 2019-06-06 PROCEDURE — 86803 HEPATITIS C AB TEST: CPT

## 2019-06-06 PROCEDURE — 36415 COLL VENOUS BLD VENIPUNCTURE: CPT

## 2019-06-06 PROCEDURE — 80061 LIPID PANEL: CPT

## 2019-06-06 PROCEDURE — 85025 COMPLETE CBC W/AUTO DIFF WBC: CPT

## 2019-06-13 ENCOUNTER — HOSPITAL ENCOUNTER (OUTPATIENT)
Facility: MEDICAL CENTER | Age: 34
End: 2019-06-13
Attending: FAMILY MEDICINE
Payer: COMMERCIAL

## 2019-06-13 LAB
APPEARANCE UR: CLEAR
BILIRUB UR QL STRIP.AUTO: NEGATIVE
COLOR UR: YELLOW
GLUCOSE UR STRIP.AUTO-MCNC: NEGATIVE MG/DL
KETONES UR STRIP.AUTO-MCNC: NEGATIVE MG/DL
LEUKOCYTE ESTERASE UR QL STRIP.AUTO: NEGATIVE
MICRO URNS: NORMAL
NITRITE UR QL STRIP.AUTO: NEGATIVE
PH UR STRIP.AUTO: 5.5 [PH]
PROT UR QL STRIP: NEGATIVE MG/DL
RBC UR QL AUTO: NEGATIVE
SP GR UR STRIP.AUTO: 1.03
UROBILINOGEN UR STRIP.AUTO-MCNC: 1 MG/DL

## 2019-06-13 PROCEDURE — 81003 URINALYSIS AUTO W/O SCOPE: CPT

## 2020-06-02 ENCOUNTER — HOSPITAL ENCOUNTER (OUTPATIENT)
Dept: LAB | Facility: MEDICAL CENTER | Age: 35
End: 2020-06-02
Attending: OBSTETRICS & GYNECOLOGY
Payer: COMMERCIAL

## 2020-06-02 PROCEDURE — 85025 COMPLETE CBC W/AUTO DIFF WBC: CPT

## 2020-06-02 PROCEDURE — 84550 ASSAY OF BLOOD/URIC ACID: CPT

## 2020-06-02 PROCEDURE — 80053 COMPREHEN METABOLIC PANEL: CPT

## 2020-06-03 LAB
ALBUMIN SERPL BCP-MCNC: 4 G/DL (ref 3.2–4.9)
ALBUMIN/GLOB SERPL: 1.6 G/DL
ALP SERPL-CCNC: 63 U/L (ref 30–99)
ALT SERPL-CCNC: 13 U/L (ref 2–50)
ANION GAP SERPL CALC-SCNC: 12 MMOL/L (ref 7–16)
AST SERPL-CCNC: 14 U/L (ref 12–45)
BASOPHILS # BLD AUTO: 0.4 % (ref 0–1.8)
BASOPHILS # BLD: 0.05 K/UL (ref 0–0.12)
BILIRUB SERPL-MCNC: <0.2 MG/DL (ref 0.1–1.5)
BUN SERPL-MCNC: 14 MG/DL (ref 8–22)
CALCIUM SERPL-MCNC: 9.4 MG/DL (ref 8.5–10.5)
CHLORIDE SERPL-SCNC: 99 MMOL/L (ref 96–112)
CO2 SERPL-SCNC: 26 MMOL/L (ref 20–33)
CREAT SERPL-MCNC: 0.42 MG/DL (ref 0.5–1.4)
EOSINOPHIL # BLD AUTO: 0.22 K/UL (ref 0–0.51)
EOSINOPHIL NFR BLD: 1.8 % (ref 0–6.9)
ERYTHROCYTE [DISTWIDTH] IN BLOOD BY AUTOMATED COUNT: 46.7 FL (ref 35.9–50)
GLOBULIN SER CALC-MCNC: 2.5 G/DL (ref 1.9–3.5)
GLUCOSE SERPL-MCNC: 90 MG/DL (ref 65–99)
HCT VFR BLD AUTO: 38.8 % (ref 37–47)
HGB BLD-MCNC: 12.4 G/DL (ref 12–16)
IMM GRANULOCYTES # BLD AUTO: 0.09 K/UL (ref 0–0.11)
IMM GRANULOCYTES NFR BLD AUTO: 0.7 % (ref 0–0.9)
LYMPHOCYTES # BLD AUTO: 3.21 K/UL (ref 1–4.8)
LYMPHOCYTES NFR BLD: 26.6 % (ref 22–41)
MCH RBC QN AUTO: 30.2 PG (ref 27–33)
MCHC RBC AUTO-ENTMCNC: 32 G/DL (ref 33.6–35)
MCV RBC AUTO: 94.6 FL (ref 81.4–97.8)
MONOCYTES # BLD AUTO: 0.42 K/UL (ref 0–0.85)
MONOCYTES NFR BLD AUTO: 3.5 % (ref 0–13.4)
NEUTROPHILS # BLD AUTO: 8.09 K/UL (ref 2–7.15)
NEUTROPHILS NFR BLD: 67 % (ref 44–72)
NRBC # BLD AUTO: 0 K/UL
NRBC BLD-RTO: 0 /100 WBC
PLATELET # BLD AUTO: 240 K/UL (ref 164–446)
PMV BLD AUTO: 11 FL (ref 9–12.9)
POTASSIUM SERPL-SCNC: 3.7 MMOL/L (ref 3.6–5.5)
PROT SERPL-MCNC: 6.5 G/DL (ref 6–8.2)
RBC # BLD AUTO: 4.1 M/UL (ref 4.2–5.4)
SODIUM SERPL-SCNC: 137 MMOL/L (ref 135–145)
URATE SERPL-MCNC: 3.5 MG/DL (ref 1.9–8.2)
WBC # BLD AUTO: 12.1 K/UL (ref 4.8–10.8)

## 2020-07-22 ENCOUNTER — HOSPITAL ENCOUNTER (OUTPATIENT)
Dept: LAB | Facility: MEDICAL CENTER | Age: 35
End: 2020-07-22
Attending: OBSTETRICS & GYNECOLOGY
Payer: COMMERCIAL

## 2020-07-22 PROCEDURE — 84156 ASSAY OF PROTEIN URINE: CPT

## 2020-07-22 PROCEDURE — 82570 ASSAY OF URINE CREATININE: CPT

## 2020-07-22 PROCEDURE — 81050 URINALYSIS VOLUME MEASURE: CPT

## 2020-07-24 LAB
CREAT UR-MCNC: 78.66 MG/DL
PROT 24H UR-MCNC: 112 MG/24 HR (ref 30–150)
PROT 24H UR-MRATE: 7 MG/DL (ref 0–15)
PROT UR-MCNC: 6 MG/DL (ref 0–15)
PROT/CREAT UR: 76 MG/G (ref 10–107)
SPECIMEN VOL UR: 1600 ML

## 2020-11-16 ENCOUNTER — HOSPITAL ENCOUNTER (OUTPATIENT)
Dept: HOSPITAL 8 - STAR | Age: 35
Discharge: HOME | End: 2020-11-16
Payer: COMMERCIAL

## 2020-11-16 DIAGNOSIS — Z20.828: Primary | ICD-10-CM

## 2020-11-16 PROCEDURE — 87635 SARS-COV-2 COVID-19 AMP PRB: CPT

## 2020-11-23 ENCOUNTER — HOSPITAL ENCOUNTER (INPATIENT)
Dept: HOSPITAL 8 - LDIP | Age: 35
LOS: 2 days | Discharge: HOME | End: 2020-11-25
Attending: OBSTETRICS & GYNECOLOGY | Admitting: OBSTETRICS & GYNECOLOGY
Payer: COMMERCIAL

## 2020-11-23 VITALS — SYSTOLIC BLOOD PRESSURE: 117 MMHG | DIASTOLIC BLOOD PRESSURE: 63 MMHG

## 2020-11-23 VITALS — SYSTOLIC BLOOD PRESSURE: 127 MMHG | DIASTOLIC BLOOD PRESSURE: 81 MMHG

## 2020-11-23 VITALS — DIASTOLIC BLOOD PRESSURE: 77 MMHG | SYSTOLIC BLOOD PRESSURE: 121 MMHG

## 2020-11-23 VITALS — WEIGHT: 273.37 LBS | BODY MASS INDEX: 48.44 KG/M2 | HEIGHT: 63 IN

## 2020-11-23 VITALS — DIASTOLIC BLOOD PRESSURE: 65 MMHG | SYSTOLIC BLOOD PRESSURE: 113 MMHG

## 2020-11-23 DIAGNOSIS — Z3A.38: ICD-10-CM

## 2020-11-23 DIAGNOSIS — O34.211: Primary | ICD-10-CM

## 2020-11-23 DIAGNOSIS — Z30.2: ICD-10-CM

## 2020-11-23 DIAGNOSIS — Z20.828: ICD-10-CM

## 2020-11-23 LAB
BASOPHILS # BLD AUTO: 0.1 X10^3/UL (ref 0–0.1)
BASOPHILS # BLD AUTO: 0.1 X10^3/UL (ref 0–0.1)
BASOPHILS NFR BLD AUTO: 1 % (ref 0–1)
BASOPHILS NFR BLD AUTO: 1 % (ref 0–1)
EOSINOPHIL # BLD AUTO: 0 X10^3/UL (ref 0–0.4)
EOSINOPHIL # BLD AUTO: 0.1 X10^3/UL (ref 0–0.4)
EOSINOPHIL NFR BLD AUTO: 0 % (ref 1–7)
EOSINOPHIL NFR BLD AUTO: 1 % (ref 1–7)
ERYTHROCYTE [DISTWIDTH] IN BLOOD BY AUTOMATED COUNT: 13.1 % (ref 9.6–15.2)
ERYTHROCYTE [DISTWIDTH] IN BLOOD BY AUTOMATED COUNT: 13.3 % (ref 9.6–15.2)
LYMPHOCYTES # BLD AUTO: 1.5 X10^3/UL (ref 1–3.4)
LYMPHOCYTES # BLD AUTO: 2.2 X10^3/UL (ref 1–3.4)
LYMPHOCYTES NFR BLD AUTO: 18 % (ref 22–44)
LYMPHOCYTES NFR BLD AUTO: 8 % (ref 22–44)
MCH RBC QN AUTO: 30.5 PG (ref 27–34.8)
MCH RBC QN AUTO: 30.9 PG (ref 27–34.8)
MCHC RBC AUTO-ENTMCNC: 33.6 G/DL (ref 32.4–35.8)
MCHC RBC AUTO-ENTMCNC: 34.2 G/DL (ref 32.4–35.8)
MD: (no result)
MD: (no result)
MONOCYTES # BLD AUTO: 0.5 X10^3/UL (ref 0.2–0.8)
MONOCYTES # BLD AUTO: 0.6 X10^3/UL (ref 0.2–0.8)
MONOCYTES NFR BLD AUTO: 3 % (ref 2–9)
MONOCYTES NFR BLD AUTO: 4 % (ref 2–9)
NEUTROPHILS # BLD AUTO: 17.5 X10^3/UL (ref 1.8–6.8)
NEUTROPHILS # BLD AUTO: 9.3 X10^3/UL (ref 1.8–6.8)
NEUTROPHILS NFR BLD AUTO: 77 % (ref 42–75)
NEUTROPHILS NFR BLD AUTO: 89 % (ref 42–75)
PLATELET # BLD AUTO: 165 X10^3/UL (ref 130–400)
PLATELET # BLD AUTO: 167 X10^3/UL (ref 130–400)
PMV BLD AUTO: 9.7 FL (ref 7.4–10.4)
PMV BLD AUTO: 9.8 FL (ref 7.4–10.4)
RBC # BLD AUTO: 3.42 X10^6/UL (ref 3.82–5.3)
RBC # BLD AUTO: 3.67 X10^6/UL (ref 3.82–5.3)

## 2020-11-23 PROCEDURE — 0UB70ZZ EXCISION OF BILATERAL FALLOPIAN TUBES, OPEN APPROACH: ICD-10-PCS | Performed by: OBSTETRICS & GYNECOLOGY

## 2020-11-23 PROCEDURE — 36415 COLL VENOUS BLD VENIPUNCTURE: CPT

## 2020-11-23 PROCEDURE — 85025 COMPLETE CBC W/AUTO DIFF WBC: CPT

## 2020-11-23 PROCEDURE — 88302 TISSUE EXAM BY PATHOLOGIST: CPT

## 2020-11-23 PROCEDURE — 86850 RBC ANTIBODY SCREEN: CPT

## 2020-11-23 PROCEDURE — 86592 SYPHILIS TEST NON-TREP QUAL: CPT

## 2020-11-23 PROCEDURE — 87635 SARS-COV-2 COVID-19 AMP PRB: CPT

## 2020-11-23 PROCEDURE — 86900 BLOOD TYPING SEROLOGIC ABO: CPT

## 2020-11-23 RX ADMIN — PRENATAL VIT W/ FE FUMARATE-FA TAB 27-0.8 MG SCH EACH: 27-0.8 TAB at 09:00

## 2020-11-23 RX ADMIN — SODIUM CHLORIDE, SODIUM LACTATE, POTASSIUM CHLORIDE, AND CALCIUM CHLORIDE SCH MLS/HR: .6; .31; .03; .02 INJECTION, SOLUTION INTRAVENOUS at 17:00

## 2020-11-23 RX ADMIN — SODIUM CHLORIDE, SODIUM LACTATE, POTASSIUM CHLORIDE, AND CALCIUM CHLORIDE SCH MLS/HR: .6; .31; .03; .02 INJECTION, SOLUTION INTRAVENOUS at 19:00

## 2020-11-23 RX ADMIN — SODIUM CHLORIDE, SODIUM LACTATE, POTASSIUM CHLORIDE, AND CALCIUM CHLORIDE SCH MLS/HR: .6; .31; .03; .02 INJECTION, SOLUTION INTRAVENOUS at 11:09

## 2020-11-23 RX ADMIN — Medication SCH MLS/HR: at 19:00

## 2020-11-23 RX ADMIN — SODIUM CHLORIDE, SODIUM LACTATE, POTASSIUM CHLORIDE, AND CALCIUM CHLORIDE SCH MLS/HR: .6; .31; .03; .02 INJECTION, SOLUTION INTRAVENOUS at 09:00

## 2020-11-23 RX ADMIN — KETOROLAC TROMETHAMINE SCH MG: 30 INJECTION, SOLUTION INTRAMUSCULAR at 21:02

## 2020-11-23 RX ADMIN — KETOROLAC TROMETHAMINE SCH MG: 30 INJECTION, SOLUTION INTRAMUSCULAR at 15:16

## 2020-11-23 RX ADMIN — LABETALOL HCL SCH MG: 100 TABLET, FILM COATED ORAL at 17:40

## 2020-11-23 RX ADMIN — Medication SCH MLS/HR: at 11:09

## 2020-11-24 VITALS — SYSTOLIC BLOOD PRESSURE: 114 MMHG | DIASTOLIC BLOOD PRESSURE: 72 MMHG

## 2020-11-24 VITALS — SYSTOLIC BLOOD PRESSURE: 123 MMHG | DIASTOLIC BLOOD PRESSURE: 80 MMHG

## 2020-11-24 VITALS — DIASTOLIC BLOOD PRESSURE: 69 MMHG | SYSTOLIC BLOOD PRESSURE: 103 MMHG

## 2020-11-24 VITALS — DIASTOLIC BLOOD PRESSURE: 63 MMHG | SYSTOLIC BLOOD PRESSURE: 101 MMHG

## 2020-11-24 VITALS — DIASTOLIC BLOOD PRESSURE: 70 MMHG | SYSTOLIC BLOOD PRESSURE: 113 MMHG

## 2020-11-24 RX ADMIN — Medication SCH MLS/HR: at 05:00

## 2020-11-24 RX ADMIN — KETOROLAC TROMETHAMINE SCH MG: 30 INJECTION, SOLUTION INTRAMUSCULAR at 09:08

## 2020-11-24 RX ADMIN — SODIUM CHLORIDE, SODIUM LACTATE, POTASSIUM CHLORIDE, AND CALCIUM CHLORIDE SCH MLS/HR: .6; .31; .03; .02 INJECTION, SOLUTION INTRAVENOUS at 05:00

## 2020-11-24 RX ADMIN — LABETALOL HCL SCH MG: 100 TABLET, FILM COATED ORAL at 07:49

## 2020-11-24 RX ADMIN — KETOROLAC TROMETHAMINE SCH MG: 30 INJECTION, SOLUTION INTRAMUSCULAR at 21:45

## 2020-11-24 RX ADMIN — PRENATAL VIT W/ FE FUMARATE-FA TAB 27-0.8 MG SCH EACH: 27-0.8 TAB at 07:49

## 2020-11-24 RX ADMIN — Medication SCH MLS/HR: at 19:00

## 2020-11-24 RX ADMIN — KETOROLAC TROMETHAMINE SCH MG: 30 INJECTION, SOLUTION INTRAMUSCULAR at 14:58

## 2020-11-24 RX ADMIN — KETOROLAC TROMETHAMINE SCH MG: 30 INJECTION, SOLUTION INTRAMUSCULAR at 03:21

## 2020-11-24 RX ADMIN — SODIUM CHLORIDE, SODIUM LACTATE, POTASSIUM CHLORIDE, AND CALCIUM CHLORIDE SCH MLS/HR: .6; .31; .03; .02 INJECTION, SOLUTION INTRAVENOUS at 01:00

## 2020-11-24 RX ADMIN — SODIUM CHLORIDE, SODIUM LACTATE, POTASSIUM CHLORIDE, AND CALCIUM CHLORIDE SCH MLS/HR: .6; .31; .03; .02 INJECTION, SOLUTION INTRAVENOUS at 19:00

## 2020-11-24 RX ADMIN — LABETALOL HCL SCH MG: 100 TABLET, FILM COATED ORAL at 18:44

## 2020-11-24 RX ADMIN — DOCUSATE SODIUM PRN MG: 100 CAPSULE, LIQUID FILLED ORAL at 07:49

## 2020-11-25 VITALS — DIASTOLIC BLOOD PRESSURE: 87 MMHG | SYSTOLIC BLOOD PRESSURE: 129 MMHG

## 2020-11-25 RX ADMIN — KETOROLAC TROMETHAMINE SCH MG: 30 INJECTION, SOLUTION INTRAMUSCULAR at 03:51

## 2020-11-25 RX ADMIN — PRENATAL VIT W/ FE FUMARATE-FA TAB 27-0.8 MG SCH EACH: 27-0.8 TAB at 08:08

## 2020-11-25 RX ADMIN — LABETALOL HCL SCH MG: 100 TABLET, FILM COATED ORAL at 08:09

## 2020-11-25 RX ADMIN — Medication SCH MLS/HR: at 01:00

## 2020-11-25 RX ADMIN — DOCUSATE SODIUM PRN MG: 100 CAPSULE, LIQUID FILLED ORAL at 08:08

## 2020-11-25 RX ADMIN — SODIUM CHLORIDE, SODIUM LACTATE, POTASSIUM CHLORIDE, AND CALCIUM CHLORIDE SCH MLS/HR: .6; .31; .03; .02 INJECTION, SOLUTION INTRAVENOUS at 01:00

## 2020-11-25 RX ADMIN — SODIUM CHLORIDE, SODIUM LACTATE, POTASSIUM CHLORIDE, AND CALCIUM CHLORIDE SCH MLS/HR: .6; .31; .03; .02 INJECTION, SOLUTION INTRAVENOUS at 07:55

## 2021-09-20 ENCOUNTER — OFFICE VISIT (OUTPATIENT)
Dept: URGENT CARE | Facility: PHYSICIAN GROUP | Age: 36
End: 2021-09-20
Payer: COMMERCIAL

## 2021-09-20 ENCOUNTER — HOSPITAL ENCOUNTER (OUTPATIENT)
Facility: MEDICAL CENTER | Age: 36
End: 2021-09-20
Attending: FAMILY MEDICINE
Payer: COMMERCIAL

## 2021-09-20 VITALS
DIASTOLIC BLOOD PRESSURE: 74 MMHG | OXYGEN SATURATION: 96 % | TEMPERATURE: 98.1 F | RESPIRATION RATE: 14 BRPM | WEIGHT: 250 LBS | HEART RATE: 80 BPM | SYSTOLIC BLOOD PRESSURE: 120 MMHG | BODY MASS INDEX: 44.3 KG/M2 | HEIGHT: 63 IN

## 2021-09-20 DIAGNOSIS — J98.8 RTI (RESPIRATORY TRACT INFECTION): ICD-10-CM

## 2021-09-20 LAB — COVID ORDER STATUS COVID19: NORMAL

## 2021-09-20 PROCEDURE — 99213 OFFICE O/P EST LOW 20 MIN: CPT | Performed by: FAMILY MEDICINE

## 2021-09-20 PROCEDURE — U0003 INFECTIOUS AGENT DETECTION BY NUCLEIC ACID (DNA OR RNA); SEVERE ACUTE RESPIRATORY SYNDROME CORONAVIRUS 2 (SARS-COV-2) (CORONAVIRUS DISEASE [COVID-19]), AMPLIFIED PROBE TECHNIQUE, MAKING USE OF HIGH THROUGHPUT TECHNOLOGIES AS DESCRIBED BY CMS-2020-01-R: HCPCS

## 2021-09-20 PROCEDURE — U0005 INFEC AGEN DETEC AMPLI PROBE: HCPCS

## 2021-09-20 ASSESSMENT — ENCOUNTER SYMPTOMS: MYALGIAS: 1

## 2021-09-20 ASSESSMENT — FIBROSIS 4 INDEX: FIB4 SCORE: 0.6

## 2021-09-20 NOTE — PROGRESS NOTES
"Subjective     Gina Padilla is a 36 y.o. female who presents with Body Aches (exposed to covid 19) and Cough      - This is a pleasant and nontoxic appearing 36 y.o. female with c/o ~ 3 days w/ aches-malaise, a little cough-stuffy nose-headache, no fever.  has c19       ALLERGIES:  Patient has no known allergies.     PMH:  History reviewed. No pertinent past medical history.     PSH:  Past Surgical History:   Procedure Laterality Date   • OTHER ORTHOPEDIC SURGERY      right ankle       MEDS:    Current Outpatient Medications:   •  LISINOPRIL PO, Take  by mouth., Disp: , Rfl:     ** I have documented what I find to be significant in regards to past medical, social, family and surgical history  in my HPI or under PMH/PSH/FH review section, otherwise it is noncontributory **           HPI    Review of Systems   HENT: Positive for congestion.    Musculoskeletal: Positive for myalgias.   All other systems reviewed and are negative.             Objective     /74   Pulse 80   Temp 36.7 °C (98.1 °F) (Temporal)   Resp 14   Ht 1.6 m (5' 3\")   Wt 113 kg (250 lb)   SpO2 96%   BMI 44.29 kg/m²      Physical Exam  Vitals and nursing note reviewed.   Constitutional:       General: She is not in acute distress.     Appearance: Normal appearance. She is well-developed.   HENT:      Head: Normocephalic and atraumatic.      Mouth/Throat:      Mouth: Mucous membranes are moist.      Pharynx: Oropharynx is clear.   Eyes:      General: No scleral icterus.  Cardiovascular:      Heart sounds: Normal heart sounds. No murmur heard.     Pulmonary:      Effort: Pulmonary effort is normal. No respiratory distress.      Breath sounds: Normal breath sounds.   Skin:     Coloration: Skin is not jaundiced or pale.   Neurological:      Mental Status: She is alert.      Motor: No abnormal muscle tone.   Psychiatric:         Mood and Affect: Mood normal.         Behavior: Behavior normal.              Assessment & Plan "          1. RTI (respiratory tract infection)  SARS-CoV-2 PCR (24 hour In-House): Collect NP swab in VTM       - Dx, plan & d/c instructions discussed   - Self isolate, call back in 2-3 days for CV19 results   - Rest, stay hydrated, OTC Motrin and/or Tylenol as needed  - E.R. precautions discussed     Asked to kindly follow up with their PCP's office in 2-3 days for a recheck, ER if not improving or feeling/getting worse.    Any realistic side effects of medications that may have been given today reviewed.     Patient left in stable condition

## 2021-09-21 LAB
SARS-COV-2 RNA RESP QL NAA+PROBE: DETECTED
SPECIMEN SOURCE: ABNORMAL

## 2022-03-15 ENCOUNTER — OFFICE VISIT (OUTPATIENT)
Dept: URGENT CARE | Facility: PHYSICIAN GROUP | Age: 37
End: 2022-03-15
Payer: COMMERCIAL

## 2022-03-15 VITALS
HEIGHT: 63 IN | DIASTOLIC BLOOD PRESSURE: 72 MMHG | TEMPERATURE: 96.7 F | RESPIRATION RATE: 16 BRPM | OXYGEN SATURATION: 96 % | BODY MASS INDEX: 45.36 KG/M2 | HEART RATE: 75 BPM | SYSTOLIC BLOOD PRESSURE: 128 MMHG | WEIGHT: 256 LBS

## 2022-03-15 DIAGNOSIS — R05.9 COUGH: ICD-10-CM

## 2022-03-15 DIAGNOSIS — J02.9 SORE THROAT: ICD-10-CM

## 2022-03-15 LAB
INT CON NEG: NORMAL
INT CON POS: NORMAL
S PYO AG THROAT QL: NEGATIVE

## 2022-03-15 PROCEDURE — 99213 OFFICE O/P EST LOW 20 MIN: CPT | Performed by: FAMILY MEDICINE

## 2022-03-15 PROCEDURE — 87880 STREP A ASSAY W/OPTIC: CPT | Performed by: FAMILY MEDICINE

## 2022-03-15 RX ORDER — BENZONATATE 100 MG/1
100 CAPSULE ORAL 3 TIMES DAILY PRN
Qty: 60 CAPSULE | Refills: 0 | Status: SHIPPED | OUTPATIENT
Start: 2022-03-15 | End: 2022-11-11

## 2022-03-15 RX ORDER — LISINOPRIL AND HYDROCHLOROTHIAZIDE 25; 20 MG/1; MG/1
TABLET ORAL
COMMUNITY
Start: 2022-02-23

## 2022-03-15 ASSESSMENT — FIBROSIS 4 INDEX: FIB4 SCORE: 0.61

## 2022-03-15 NOTE — PROGRESS NOTES
"  Subjective:      37 y.o. female presents to urgent care for cold symptoms that started early this morning. She is experiencing cough and sore throat. No headache, body aches, fevers, or diarrhea. She tried Theraflu which did provide some relief. She smokes an average of 5-7 cigarettes per day.  No history of asthma or COPD.  She is fully vaccinated against COVID.  She has had no known sick contacts.    She denies any other questions or concerns at this time.    Current problem list, medication, and past medical/surgical history were reviewed in Epic.    ROS  See HPI     Objective:      /72   Pulse 75   Temp 35.9 °C (96.7 °F) (Temporal)   Resp 16   Ht 1.6 m (5' 3\")   Wt 116 kg (256 lb)   SpO2 96%   BMI 45.35 kg/m²     Physical Exam  Constitutional:       General: She is not in acute distress.     Appearance: She is not diaphoretic.   HENT:      Right Ear: Tympanic membrane, ear canal and external ear normal.      Left Ear: Tympanic membrane, ear canal and external ear normal.      Nose:      Right Sinus: No maxillary sinus tenderness or frontal sinus tenderness.      Left Sinus: No maxillary sinus tenderness or frontal sinus tenderness.      Mouth/Throat:      Palate: No lesions.      Pharynx: Uvula midline. Posterior oropharyngeal erythema present.      Tonsils: No tonsillar exudate. 1+ on the right. 1+ on the left.   Cardiovascular:      Rate and Rhythm: Normal rate and regular rhythm.      Heart sounds: Normal heart sounds.   Pulmonary:      Effort: Pulmonary effort is normal. No respiratory distress.      Breath sounds: Normal breath sounds.   Neurological:      Mental Status: She is alert.   Psychiatric:         Mood and Affect: Affect normal.         Judgment: Judgment normal.       Assessment/Plan:     1. Sore throat  2. Cough  Rapid strep negative.  Likely viral etiology.  Prescription for Tessalon pearls has been sent.  Tylenol ibuprofen as needed for symptomatic relief.  - POCT Rapid Strep " A  - benzonatate (TESSALON) 100 MG Cap; Take 1 Capsule by mouth 3 times a day as needed for Cough.  Dispense: 60 Capsule; Refill: 0        Instructed to return to Urgent Care or nearest Emergency Department if symptoms fail to improve, for any change in condition, further concerns, or new concerning symptoms. Patient states understanding of the plan of care and discharge instructions.    Simin Ham M.D.

## 2022-03-19 ENCOUNTER — HOSPITAL ENCOUNTER (OUTPATIENT)
Dept: LAB | Facility: MEDICAL CENTER | Age: 37
End: 2022-03-19
Attending: NURSE PRACTITIONER
Payer: COMMERCIAL

## 2022-03-19 ENCOUNTER — APPOINTMENT (OUTPATIENT)
Dept: RADIOLOGY | Facility: IMAGING CENTER | Age: 37
End: 2022-03-19
Attending: NURSE PRACTITIONER
Payer: COMMERCIAL

## 2022-03-19 ENCOUNTER — APPOINTMENT (OUTPATIENT)
Dept: URGENT CARE | Facility: PHYSICIAN GROUP | Age: 37
End: 2022-03-19
Payer: COMMERCIAL

## 2022-03-19 DIAGNOSIS — G89.29 CHRONIC PAIN OF RIGHT KNEE: ICD-10-CM

## 2022-03-19 DIAGNOSIS — M25.561 CHRONIC PAIN OF RIGHT KNEE: ICD-10-CM

## 2022-03-19 LAB
ALBUMIN SERPL BCP-MCNC: 4.1 G/DL (ref 3.2–4.9)
ALBUMIN/GLOB SERPL: 1.5 G/DL
ALP SERPL-CCNC: 108 U/L (ref 30–99)
ALT SERPL-CCNC: 13 U/L (ref 2–50)
ANION GAP SERPL CALC-SCNC: 10 MMOL/L (ref 7–16)
AST SERPL-CCNC: 15 U/L (ref 12–45)
BASOPHILS # BLD AUTO: 0.4 % (ref 0–1.8)
BASOPHILS # BLD: 0.05 K/UL (ref 0–0.12)
BILIRUB SERPL-MCNC: 0.3 MG/DL (ref 0.1–1.5)
BUN SERPL-MCNC: 14 MG/DL (ref 8–22)
CALCIUM SERPL-MCNC: 9.4 MG/DL (ref 8.5–10.5)
CHLORIDE SERPL-SCNC: 102 MMOL/L (ref 96–112)
CHOLEST SERPL-MCNC: 186 MG/DL (ref 100–199)
CO2 SERPL-SCNC: 28 MMOL/L (ref 20–33)
CREAT SERPL-MCNC: 0.59 MG/DL (ref 0.5–1.4)
EOSINOPHIL # BLD AUTO: 0.2 K/UL (ref 0–0.51)
EOSINOPHIL NFR BLD: 1.7 % (ref 0–6.9)
ERYTHROCYTE [DISTWIDTH] IN BLOOD BY AUTOMATED COUNT: 42 FL (ref 35.9–50)
EST. AVERAGE GLUCOSE BLD GHB EST-MCNC: 97 MG/DL
FASTING STATUS PATIENT QL REPORTED: NORMAL
GFR SERPLBLD CREATININE-BSD FMLA CKD-EPI: 118 ML/MIN/1.73 M 2
GLOBULIN SER CALC-MCNC: 2.7 G/DL (ref 1.9–3.5)
GLUCOSE SERPL-MCNC: 76 MG/DL (ref 65–99)
HBA1C MFR BLD: 5 % (ref 4–5.6)
HCT VFR BLD AUTO: 42.3 % (ref 37–47)
HDLC SERPL-MCNC: 49 MG/DL
HGB BLD-MCNC: 14 G/DL (ref 12–16)
IMM GRANULOCYTES # BLD AUTO: 0.07 K/UL (ref 0–0.11)
IMM GRANULOCYTES NFR BLD AUTO: 0.6 % (ref 0–0.9)
LDLC SERPL CALC-MCNC: 115 MG/DL
LYMPHOCYTES # BLD AUTO: 2.84 K/UL (ref 1–4.8)
LYMPHOCYTES NFR BLD: 24 % (ref 22–41)
MCH RBC QN AUTO: 30 PG (ref 27–33)
MCHC RBC AUTO-ENTMCNC: 33.1 G/DL (ref 33.6–35)
MCV RBC AUTO: 90.6 FL (ref 81.4–97.8)
MONOCYTES # BLD AUTO: 0.42 K/UL (ref 0–0.85)
MONOCYTES NFR BLD AUTO: 3.5 % (ref 0–13.4)
NEUTROPHILS # BLD AUTO: 8.27 K/UL (ref 2–7.15)
NEUTROPHILS NFR BLD: 69.8 % (ref 44–72)
NRBC # BLD AUTO: 0 K/UL
NRBC BLD-RTO: 0 /100 WBC
PLATELET # BLD AUTO: 256 K/UL (ref 164–446)
PMV BLD AUTO: 11.2 FL (ref 9–12.9)
POTASSIUM SERPL-SCNC: 4.6 MMOL/L (ref 3.6–5.5)
PROT SERPL-MCNC: 6.8 G/DL (ref 6–8.2)
RBC # BLD AUTO: 4.67 M/UL (ref 4.2–5.4)
SODIUM SERPL-SCNC: 140 MMOL/L (ref 135–145)
TRIGL SERPL-MCNC: 110 MG/DL (ref 0–149)
WBC # BLD AUTO: 11.9 K/UL (ref 4.8–10.8)

## 2022-03-19 PROCEDURE — 36415 COLL VENOUS BLD VENIPUNCTURE: CPT

## 2022-03-19 PROCEDURE — 80061 LIPID PANEL: CPT

## 2022-03-19 PROCEDURE — 85025 COMPLETE CBC W/AUTO DIFF WBC: CPT

## 2022-03-19 PROCEDURE — 80053 COMPREHEN METABOLIC PANEL: CPT

## 2022-03-19 PROCEDURE — 73564 X-RAY EXAM KNEE 4 OR MORE: CPT | Mod: TC,FY,RT | Performed by: RADIOLOGY

## 2022-03-19 PROCEDURE — 83036 HEMOGLOBIN GLYCOSYLATED A1C: CPT

## 2022-06-06 ENCOUNTER — HOSPITAL ENCOUNTER (OUTPATIENT)
Dept: LAB | Facility: MEDICAL CENTER | Age: 37
End: 2022-06-06
Attending: NURSE PRACTITIONER
Payer: COMMERCIAL

## 2022-06-06 ENCOUNTER — HOSPITAL ENCOUNTER (OUTPATIENT)
Dept: RADIOLOGY | Facility: MEDICAL CENTER | Age: 37
End: 2022-06-06
Attending: NURSE PRACTITIONER
Payer: COMMERCIAL

## 2022-06-06 DIAGNOSIS — R05.9 COUGH: ICD-10-CM

## 2022-06-06 LAB
ALBUMIN SERPL BCP-MCNC: 4.1 G/DL (ref 3.2–4.9)
ALBUMIN/GLOB SERPL: 1.4 G/DL
ALP SERPL-CCNC: 105 U/L (ref 30–99)
ALT SERPL-CCNC: 23 U/L (ref 2–50)
ANION GAP SERPL CALC-SCNC: 11 MMOL/L (ref 7–16)
AST SERPL-CCNC: 24 U/L (ref 12–45)
BASOPHILS # BLD AUTO: 0.5 % (ref 0–1.8)
BASOPHILS # BLD: 0.06 K/UL (ref 0–0.12)
BILIRUB SERPL-MCNC: 0.3 MG/DL (ref 0.1–1.5)
BUN SERPL-MCNC: 19 MG/DL (ref 8–22)
CALCIUM SERPL-MCNC: 8.6 MG/DL (ref 8.5–10.5)
CHLORIDE SERPL-SCNC: 101 MMOL/L (ref 96–112)
CHOLEST SERPL-MCNC: 171 MG/DL (ref 100–199)
CO2 SERPL-SCNC: 27 MMOL/L (ref 20–33)
CREAT SERPL-MCNC: 0.63 MG/DL (ref 0.5–1.4)
EOSINOPHIL # BLD AUTO: 0.1 K/UL (ref 0–0.51)
EOSINOPHIL NFR BLD: 0.9 % (ref 0–6.9)
ERYTHROCYTE [DISTWIDTH] IN BLOOD BY AUTOMATED COUNT: 47.1 FL (ref 35.9–50)
EST. AVERAGE GLUCOSE BLD GHB EST-MCNC: 94 MG/DL
FASTING STATUS PATIENT QL REPORTED: NORMAL
GFR SERPLBLD CREATININE-BSD FMLA CKD-EPI: 116 ML/MIN/1.73 M 2
GLOBULIN SER CALC-MCNC: 3 G/DL (ref 1.9–3.5)
GLUCOSE SERPL-MCNC: 83 MG/DL (ref 65–99)
HBA1C MFR BLD: 4.9 % (ref 4–5.6)
HCT VFR BLD AUTO: 41.2 % (ref 37–47)
HDLC SERPL-MCNC: 46 MG/DL
HGB BLD-MCNC: 13.7 G/DL (ref 12–16)
IMM GRANULOCYTES # BLD AUTO: 0.06 K/UL (ref 0–0.11)
IMM GRANULOCYTES NFR BLD AUTO: 0.5 % (ref 0–0.9)
LDLC SERPL CALC-MCNC: 86 MG/DL
LYMPHOCYTES # BLD AUTO: 1.37 K/UL (ref 1–4.8)
LYMPHOCYTES NFR BLD: 12.3 % (ref 22–41)
MCH RBC QN AUTO: 30.6 PG (ref 27–33)
MCHC RBC AUTO-ENTMCNC: 33.3 G/DL (ref 33.6–35)
MCV RBC AUTO: 92.2 FL (ref 81.4–97.8)
MONOCYTES # BLD AUTO: 0.83 K/UL (ref 0–0.85)
MONOCYTES NFR BLD AUTO: 7.4 % (ref 0–13.4)
NEUTROPHILS # BLD AUTO: 8.73 K/UL (ref 2–7.15)
NEUTROPHILS NFR BLD: 78.4 % (ref 44–72)
NRBC # BLD AUTO: 0 K/UL
NRBC BLD-RTO: 0 /100 WBC
PLATELET # BLD AUTO: 229 K/UL (ref 164–446)
PMV BLD AUTO: 11.3 FL (ref 9–12.9)
POTASSIUM SERPL-SCNC: 4 MMOL/L (ref 3.6–5.5)
PROT SERPL-MCNC: 7.1 G/DL (ref 6–8.2)
RBC # BLD AUTO: 4.47 M/UL (ref 4.2–5.4)
SODIUM SERPL-SCNC: 139 MMOL/L (ref 135–145)
TRIGL SERPL-MCNC: 196 MG/DL (ref 0–149)
WBC # BLD AUTO: 11.2 K/UL (ref 4.8–10.8)

## 2022-06-06 PROCEDURE — 71046 X-RAY EXAM CHEST 2 VIEWS: CPT

## 2022-06-06 PROCEDURE — 80053 COMPREHEN METABOLIC PANEL: CPT

## 2022-06-06 PROCEDURE — 36415 COLL VENOUS BLD VENIPUNCTURE: CPT

## 2022-06-06 PROCEDURE — 85025 COMPLETE CBC W/AUTO DIFF WBC: CPT

## 2022-06-06 PROCEDURE — 83036 HEMOGLOBIN GLYCOSYLATED A1C: CPT

## 2022-06-06 PROCEDURE — 80061 LIPID PANEL: CPT

## 2022-11-11 ENCOUNTER — OFFICE VISIT (OUTPATIENT)
Dept: URGENT CARE | Facility: PHYSICIAN GROUP | Age: 37
End: 2022-11-11
Payer: COMMERCIAL

## 2022-11-11 VITALS
SYSTOLIC BLOOD PRESSURE: 100 MMHG | DIASTOLIC BLOOD PRESSURE: 78 MMHG | TEMPERATURE: 97 F | WEIGHT: 238 LBS | OXYGEN SATURATION: 96 % | BODY MASS INDEX: 42.17 KG/M2 | HEART RATE: 76 BPM | RESPIRATION RATE: 16 BRPM | HEIGHT: 63 IN

## 2022-11-11 DIAGNOSIS — J98.8 RESPIRATORY TRACT INFECTION: ICD-10-CM

## 2022-11-11 DIAGNOSIS — R05.1 ACUTE COUGH: ICD-10-CM

## 2022-11-11 PROCEDURE — 99213 OFFICE O/P EST LOW 20 MIN: CPT | Performed by: PHYSICIAN ASSISTANT

## 2022-11-11 RX ORDER — METHYLPREDNISOLONE 4 MG/1
TABLET ORAL
Qty: 21 TABLET | Refills: 0 | Status: SHIPPED | OUTPATIENT
Start: 2022-11-11 | End: 2023-04-13

## 2022-11-11 RX ORDER — DEXTROMETHORPHAN HYDROBROMIDE AND PROMETHAZINE HYDROCHLORIDE 15; 6.25 MG/5ML; MG/5ML
5 SYRUP ORAL NIGHTLY PRN
Qty: 120 ML | Refills: 0 | Status: SHIPPED | OUTPATIENT
Start: 2022-11-11 | End: 2023-11-13

## 2022-11-11 RX ORDER — BENZONATATE 100 MG/1
100 CAPSULE ORAL 3 TIMES DAILY PRN
Qty: 60 CAPSULE | Refills: 0 | Status: SHIPPED | OUTPATIENT
Start: 2022-11-11 | End: 2023-11-13

## 2022-11-11 RX ORDER — AMOXICILLIN AND CLAVULANATE POTASSIUM 875; 125 MG/1; MG/1
1 TABLET, FILM COATED ORAL 2 TIMES DAILY
Qty: 14 TABLET | Refills: 0 | Status: SHIPPED | OUTPATIENT
Start: 2022-11-11 | End: 2022-11-18

## 2022-11-11 ASSESSMENT — ENCOUNTER SYMPTOMS
SORE THROAT: 1
EYE DISCHARGE: 0
COUGH: 1
DIARRHEA: 1
VOMITING: 0
EYE REDNESS: 0
FEVER: 0
HEADACHES: 0
SHORTNESS OF BREATH: 0
NAUSEA: 0
MYALGIAS: 1

## 2022-11-11 ASSESSMENT — FIBROSIS 4 INDEX: FIB4 SCORE: 0.83

## 2022-11-12 NOTE — PROGRESS NOTES
Subjective     Gina Padilla is a 37 y.o. female who presents with Cough (Cough/Ear Pain x1 week)            URI   This is a new problem. Episode onset: x 1 week ago. The problem has been unchanged. There has been no fever. Associated symptoms include congestion, coughing (The patient reports an associated dry cough with intermittent production of sputum.), diarrhea (now improved), ear pain and a sore throat. Pertinent negatives include no chest pain, headaches, nausea or vomiting. Treatments tried: OTC DayQuil and OTC Mucinex.     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  The patient reports no known exposure to COVID-19.  No known exposure to influenza.    PMH:  has no past medical history on file.  MEDS:   Current Outpatient Medications:     lisinopril-hydrochlorothiazide (PRINZIDE) 20-25 MG per tablet, , Disp: , Rfl:     benzonatate (TESSALON) 100 MG Cap, Take 1 Capsule by mouth 3 times a day as needed for Cough., Disp: 60 Capsule, Rfl: 0  ALLERGIES: No Known Allergies  SURGHX:   Past Surgical History:   Procedure Laterality Date    OTHER ORTHOPEDIC SURGERY      right ankle     SOCHX:  reports that she has been smoking cigarettes. She has been smoking an average of .25 packs per day. She has never used smokeless tobacco. She reports current alcohol use. She reports that she does not use drugs.  FH: Family history was reviewed, no pertinent findings to report    Review of Systems   Constitutional:  Negative for fever.   HENT:  Positive for congestion, ear pain and sore throat.    Eyes:  Negative for discharge and redness.   Respiratory:  Positive for cough (The patient reports an associated dry cough with intermittent production of sputum.). Negative for shortness of breath.    Cardiovascular:  Negative for chest pain.   Gastrointestinal:  Positive for diarrhea (now improved). Negative for nausea and vomiting.   Musculoskeletal:  Positive for myalgias (now  "improved).   Neurological:  Negative for headaches.            Objective     /78   Pulse 76   Temp 36.1 °C (97 °F) (Temporal)   Resp 16   Ht 1.6 m (5' 3\")   Wt 108 kg (238 lb)   SpO2 96%   BMI 42.16 kg/m²      Physical Exam  Constitutional:       General: She is not in acute distress.     Appearance: Normal appearance. She is not ill-appearing.   HENT:      Head: Normocephalic and atraumatic.      Right Ear: Ear canal and external ear normal. Tympanic membrane is injected and erythematous.      Left Ear: Tympanic membrane, ear canal and external ear normal.      Nose: Nose normal.      Mouth/Throat:      Mouth: Mucous membranes are moist.      Pharynx: Oropharynx is clear. Uvula midline. No posterior oropharyngeal erythema.      Tonsils: No tonsillar exudate.   Eyes:      Extraocular Movements: Extraocular movements intact.      Conjunctiva/sclera: Conjunctivae normal.   Cardiovascular:      Rate and Rhythm: Normal rate and regular rhythm.      Heart sounds: Normal heart sounds.   Pulmonary:      Effort: Pulmonary effort is normal. No respiratory distress.      Breath sounds: Normal breath sounds. No wheezing.   Musculoskeletal:         General: Normal range of motion.      Cervical back: Normal range of motion and neck supple.   Skin:     General: Skin is warm and dry.   Neurological:      Mental Status: She is alert and oriented to person, place, and time.                Progress:  The patient declined COVID-19 testing at this time.           Assessment & Plan          1. Respiratory tract infection  - amoxicillin-clavulanate (AUGMENTIN) 875-125 MG Tab; Take 1 Tablet by mouth 2 times a day for 7 days.  Dispense: 14 Tablet; Refill: 0  - methylPREDNISolone (MEDROL DOSEPAK) 4 MG Tablet Therapy Pack; Follow schedule on package instructions.  Dispense: 21 Tablet; Refill: 0    2. Acute cough  - benzonatate (TESSALON) 100 MG Cap; Take 1 Capsule by mouth 3 times a day as needed for Cough.  Dispense: 60 " Capsule; Refill: 0  - promethazine-dextromethorphan (PROMETHAZINE-DM) 6.25-15 MG/5ML syrup; Take 5 mL by mouth at bedtime as needed for Cough.  Dispense: 120 mL; Refill: 0  -- Advised the patient to only take this medication at night, as it may cause drowsiness. Instructed the patient not to take the medication while at work, driving, or operating machinery.  Instructed the patient not to drink alcohol while taking this medication.    The patient's presenting symptoms and physical exam findings are consistent with a respiratory tract infection with an associated cough.  On physical exam, the patient's right TM was found to be injected and erythematous.  The the patient's left TM was clear without erythema.  The patient's posterior pharynx was also clear without erythema or tonsillar hypertrophy/exudates.  The patient's lungs were clear to auscultation without wheezing, and her pulse ox was within normal limits.  The patient is nontoxic and appears in no acute distress.  The patient's vital signs are stable and within normal limits.  She is afebrile today in clinic.  Will prescribe the patient Augmentin and a Medrol Dosepak for her current symptoms.  This will also cover for the patient's likely ear infection of the right ear.  Additionally, will prescribe the patient Tessalon and Promethazine DM for symptomatic relief of her cough.  Advised the patient to only take the Promethazine DM at night, as it may cause drowsiness.  Instructed the patient not to take this medication while at work, driving, or operating machinery.  Recommend OTC medications and supportive care for symptomatic management.  Recommend patient follow-up with her PCP as needed.  Discussed return precautions with the patient, and she verbalized understanding.    Differential diagnoses, supportive care, and indications for immediate follow-up discussed with patient.   Instructed to return to clinic or nearest emergency department for any change in  condition, further concerns, or worsening of symptoms.    OTC Tylenol or Motrin for fever/discomfort.  OTC cough/cold medication for symptomatic relief  OTC Supportive Care for Congestion - saline nasal spray or neti pot  Drink plenty of fluids  Follow-up with PCP  Return to clinic or go to the ED if symptoms worsen or fail to improve, or if the patient should develop worsening/increasing cough, congestion, ear pain, sore throat, shortness of breath, wheezing, chest pain, fever/chills, and/or any concerning symptoms.    Discussed plan with the patient, and she agrees to the above.     I personally reviewed prior external notes and test results pertinent to today's visit.  I have independently reviewed and interpreted all diagnostics ordered during this urgent care visit.     Please note that this dictation was created using voice recognition software. I have made every reasonable attempt to correct obvious errors, but I expect that there may be errors of grammar and possibly content that I did not discover before finalizing the note.     This note was electronically signed by Alexandria Rome PA-C

## 2023-04-13 ENCOUNTER — OFFICE VISIT (OUTPATIENT)
Dept: URGENT CARE | Facility: PHYSICIAN GROUP | Age: 38
End: 2023-04-13
Payer: MEDICAID

## 2023-04-13 VITALS
DIASTOLIC BLOOD PRESSURE: 74 MMHG | HEIGHT: 63 IN | WEIGHT: 232 LBS | OXYGEN SATURATION: 96 % | SYSTOLIC BLOOD PRESSURE: 124 MMHG | HEART RATE: 91 BPM | TEMPERATURE: 98.2 F | RESPIRATION RATE: 16 BRPM | BODY MASS INDEX: 41.11 KG/M2

## 2023-04-13 DIAGNOSIS — J02.0 PHARYNGITIS DUE TO STREPTOCOCCUS SPECIES: ICD-10-CM

## 2023-04-13 LAB — S PYO DNA SPEC NAA+PROBE: DETECTED

## 2023-04-13 PROCEDURE — 99213 OFFICE O/P EST LOW 20 MIN: CPT

## 2023-04-13 PROCEDURE — 87651 STREP A DNA AMP PROBE: CPT

## 2023-04-13 RX ORDER — DEXAMETHASONE SODIUM PHOSPHATE 10 MG/ML
10 INJECTION INTRAMUSCULAR; INTRAVENOUS ONCE
Status: COMPLETED | OUTPATIENT
Start: 2023-04-13 | End: 2023-04-13

## 2023-04-13 RX ORDER — PENICILLIN V POTASSIUM 500 MG/1
500 TABLET ORAL 2 TIMES DAILY
Qty: 20 TABLET | Refills: 0 | Status: SHIPPED | OUTPATIENT
Start: 2023-04-13 | End: 2023-04-23

## 2023-04-13 RX ORDER — BUPROPION HYDROCHLORIDE 150 MG/1
150 TABLET, EXTENDED RELEASE ORAL 2 TIMES DAILY
COMMUNITY

## 2023-04-13 RX ORDER — DEXAMETHASONE SODIUM PHOSPHATE 10 MG/ML
10 INJECTION INTRAMUSCULAR; INTRAVENOUS ONCE
Status: DISCONTINUED | OUTPATIENT
Start: 2023-04-13 | End: 2023-04-13

## 2023-04-13 RX ADMIN — DEXAMETHASONE SODIUM PHOSPHATE 10 MG: 10 INJECTION INTRAMUSCULAR; INTRAVENOUS at 15:32

## 2023-04-13 ASSESSMENT — ENCOUNTER SYMPTOMS
CHILLS: 1
FEVER: 0
COUGH: 0

## 2023-04-13 ASSESSMENT — FIBROSIS 4 INDEX: FIB4 SCORE: 0.85

## 2023-04-13 NOTE — PROGRESS NOTES
"Subjective:     Gina Padilla is a 38 y.o. female who presents for Pharyngitis (Poss strep x 1 day swollen glands. Slight cough.  ) and Chills      Pharyngitis   Episode onset: Two days ago. There has been no fever. Pertinent negatives include no coughing. Associated symptoms comments: Chills, painful to swallow. Treatments tried: Dayquil, hot tea and honey. The treatment provided no relief.     Review of Systems   Constitutional:  Positive for chills. Negative for fever.   Respiratory:  Negative for cough.    All other systems reviewed and are negative.    PMH: No past medical history on file.  ALLERGIES: No Known Allergies  SURGHX:   Past Surgical History:   Procedure Laterality Date    OTHER ORTHOPEDIC SURGERY      right ankle     SOCHX:   Social History     Socioeconomic History    Marital status:    Tobacco Use    Smoking status: Every Day     Packs/day: 0.25     Types: Cigarettes    Smokeless tobacco: Never    Tobacco comments:     1/2 ppd   Vaping Use    Vaping Use: Never used   Substance and Sexual Activity    Alcohol use: Yes     Comment: Occasionally    Drug use: No     FH: No family history on file.      Objective:   /74   Pulse 91   Temp 36.8 °C (98.2 °F) (Temporal)   Resp 16   Ht 1.6 m (5' 3\")   Wt 105 kg (232 lb)   SpO2 96%   BMI 41.10 kg/m²     Physical Exam  Vitals reviewed.   Constitutional:       General: She is not in acute distress.     Appearance: Normal appearance. She is ill-appearing.   HENT:      Head: Normocephalic and atraumatic.      Right Ear: Tympanic membrane, ear canal and external ear normal.      Left Ear: Tympanic membrane, ear canal and external ear normal.      Nose: Congestion present. No rhinorrhea.      Mouth/Throat:      Mouth: Mucous membranes are moist.      Pharynx: Posterior oropharyngeal erythema present. No oropharyngeal exudate.   Eyes:      Extraocular Movements: Extraocular movements intact.      Conjunctiva/sclera: Conjunctivae " normal.      Pupils: Pupils are equal, round, and reactive to light.   Cardiovascular:      Rate and Rhythm: Normal rate and regular rhythm.   Pulmonary:      Effort: Pulmonary effort is normal.   Musculoskeletal:         General: Normal range of motion.      Cervical back: Normal range of motion.   Skin:     General: Skin is warm and dry.   Neurological:      Mental Status: She is alert.   Psychiatric:         Mood and Affect: Mood normal.         Behavior: Behavior normal.         Thought Content: Thought content normal.           Assessment/Plan:   Assessment      1. Pharyngitis due to Streptococcus species  - dexamethasone (DECADRON) injection (check route below) 10 mg  - penicillin v potassium (VEETID) 500 MG Tab; Take 1 Tablet by mouth 2 times a day for 10 days.  Dispense: 20 Tablet; Refill: 0    POCT group A strep PCR positive in clinic.  Prescription for penicillin the sent to patient's preferred pharmacy.  Educated patient about supportive care and antibiotic use.  Patient to change toothbrush in 48 hours after starting antibiotics.  Symptom management for cough, congestion, and pharyngitis include warm salt water gargles, over-the-counter throat sprays, rest, hydration with frozen (eg, ice or popsicles) or warmed liquids, herbal tea containing licorice root, elm inner bark, marshmallow root, and licorice root aqueous dry extract, Tylenol/Ibuprofen for pain control, Cepacol lozenges, soft diet, honey, zinc, elderberry, and cool mist humidification. Per the Toast trial I will administer 10 mg of dexamethasone in clinic for pharyngitis.  I discussed the risks vs benefits as well as alternatives with the patient and using shared decision making we have elected this as a treatment modality.  All questions answered. Patient verbalized understanding and is in agreement with this plan of care.      Differential diagnosis, natural history, and supportive care discussed. AVS handout given and reviewed with patient.  Patient educated on red flags and when to seek treatment back in ED or UC.     I personally reviewed prior external notes and test results pertinent to today's visit.  I have independently reviewed and interpreted all diagnostics ordered during this urgent care visit.     This dictation has been created using voice recognition software. The accuracy of the dictation is limited by the abilities of the software. I expect there may be some errors of grammar and possibly content. I made every attempt to manually correct the errors within my dictation. However, errors related to voice recognition software may still exist and should be interpreted within the appropriate context.    This note was electronically signed by AZEB Ascencio

## 2023-07-27 ENCOUNTER — HOSPITAL ENCOUNTER (OUTPATIENT)
Dept: LAB | Facility: MEDICAL CENTER | Age: 38
End: 2023-07-27
Attending: NURSE PRACTITIONER
Payer: MEDICAID

## 2023-07-27 LAB
ALBUMIN SERPL BCP-MCNC: 4.3 G/DL (ref 3.2–4.9)
ALBUMIN/GLOB SERPL: 1.3 G/DL
ALP SERPL-CCNC: 99 U/L (ref 30–99)
ALT SERPL-CCNC: 14 U/L (ref 2–50)
ANION GAP SERPL CALC-SCNC: 10 MMOL/L (ref 7–16)
AST SERPL-CCNC: 15 U/L (ref 12–45)
BASOPHILS # BLD AUTO: 0.5 % (ref 0–1.8)
BASOPHILS # BLD: 0.05 K/UL (ref 0–0.12)
BILIRUB SERPL-MCNC: 0.5 MG/DL (ref 0.1–1.5)
BUN SERPL-MCNC: 15 MG/DL (ref 8–22)
CALCIUM ALBUM COR SERPL-MCNC: 9.3 MG/DL (ref 8.5–10.5)
CALCIUM SERPL-MCNC: 9.5 MG/DL (ref 8.5–10.5)
CHLORIDE SERPL-SCNC: 101 MMOL/L (ref 96–112)
CHOLEST SERPL-MCNC: 160 MG/DL (ref 100–199)
CO2 SERPL-SCNC: 26 MMOL/L (ref 20–33)
CREAT SERPL-MCNC: 0.62 MG/DL (ref 0.5–1.4)
EOSINOPHIL # BLD AUTO: 0.09 K/UL (ref 0–0.51)
EOSINOPHIL NFR BLD: 0.9 % (ref 0–6.9)
ERYTHROCYTE [DISTWIDTH] IN BLOOD BY AUTOMATED COUNT: 42.7 FL (ref 35.9–50)
EST. AVERAGE GLUCOSE BLD GHB EST-MCNC: 105 MG/DL
FASTING STATUS PATIENT QL REPORTED: NORMAL
GFR SERPLBLD CREATININE-BSD FMLA CKD-EPI: 116 ML/MIN/1.73 M 2
GLOBULIN SER CALC-MCNC: 3.4 G/DL (ref 1.9–3.5)
GLUCOSE SERPL-MCNC: 86 MG/DL (ref 65–99)
HBA1C MFR BLD: 5.3 % (ref 4–5.6)
HCT VFR BLD AUTO: 42.2 % (ref 37–47)
HDLC SERPL-MCNC: 45 MG/DL
HGB BLD-MCNC: 13.7 G/DL (ref 12–16)
IMM GRANULOCYTES # BLD AUTO: 0.03 K/UL (ref 0–0.11)
IMM GRANULOCYTES NFR BLD AUTO: 0.3 % (ref 0–0.9)
LDLC SERPL CALC-MCNC: 96 MG/DL
LYMPHOCYTES # BLD AUTO: 2.45 K/UL (ref 1–4.8)
LYMPHOCYTES NFR BLD: 25.4 % (ref 22–41)
MCH RBC QN AUTO: 29.1 PG (ref 27–33)
MCHC RBC AUTO-ENTMCNC: 32.5 G/DL (ref 32.2–35.5)
MCV RBC AUTO: 89.8 FL (ref 81.4–97.8)
MONOCYTES # BLD AUTO: 0.43 K/UL (ref 0–0.85)
MONOCYTES NFR BLD AUTO: 4.5 % (ref 0–13.4)
NEUTROPHILS # BLD AUTO: 6.58 K/UL (ref 1.82–7.42)
NEUTROPHILS NFR BLD: 68.4 % (ref 44–72)
NRBC # BLD AUTO: 0 K/UL
NRBC BLD-RTO: 0 /100 WBC (ref 0–0.2)
PLATELET # BLD AUTO: 248 K/UL (ref 164–446)
PMV BLD AUTO: 11.5 FL (ref 9–12.9)
POTASSIUM SERPL-SCNC: 4.2 MMOL/L (ref 3.6–5.5)
PROT SERPL-MCNC: 7.7 G/DL (ref 6–8.2)
RBC # BLD AUTO: 4.7 M/UL (ref 4.2–5.4)
SODIUM SERPL-SCNC: 137 MMOL/L (ref 135–145)
TRIGL SERPL-MCNC: 94 MG/DL (ref 0–149)
WBC # BLD AUTO: 9.6 K/UL (ref 4.8–10.8)

## 2023-07-27 PROCEDURE — 36415 COLL VENOUS BLD VENIPUNCTURE: CPT

## 2023-07-27 PROCEDURE — 85025 COMPLETE CBC W/AUTO DIFF WBC: CPT

## 2023-07-27 PROCEDURE — 80053 COMPREHEN METABOLIC PANEL: CPT

## 2023-07-27 PROCEDURE — 83036 HEMOGLOBIN GLYCOSYLATED A1C: CPT

## 2023-07-27 PROCEDURE — 80061 LIPID PANEL: CPT

## 2023-10-24 ENCOUNTER — OFFICE VISIT (OUTPATIENT)
Dept: URGENT CARE | Facility: PHYSICIAN GROUP | Age: 38
End: 2023-10-24
Payer: COMMERCIAL

## 2023-10-24 VITALS
HEIGHT: 63 IN | BODY MASS INDEX: 39.34 KG/M2 | OXYGEN SATURATION: 99 % | HEART RATE: 104 BPM | SYSTOLIC BLOOD PRESSURE: 126 MMHG | TEMPERATURE: 98.4 F | WEIGHT: 222 LBS | RESPIRATION RATE: 15 BRPM | DIASTOLIC BLOOD PRESSURE: 68 MMHG

## 2023-10-24 DIAGNOSIS — J02.0 STREP PHARYNGITIS: ICD-10-CM

## 2023-10-24 DIAGNOSIS — R50.9 FEVER, UNSPECIFIED FEVER CAUSE: ICD-10-CM

## 2023-10-24 LAB
FLUAV RNA SPEC QL NAA+PROBE: NEGATIVE
FLUBV RNA SPEC QL NAA+PROBE: NEGATIVE
RSV RNA SPEC QL NAA+PROBE: NEGATIVE
S PYO DNA SPEC NAA+PROBE: DETECTED
SARS-COV-2 RNA RESP QL NAA+PROBE: NEGATIVE

## 2023-10-24 PROCEDURE — 3074F SYST BP LT 130 MM HG: CPT

## 2023-10-24 PROCEDURE — 99214 OFFICE O/P EST MOD 30 MIN: CPT

## 2023-10-24 PROCEDURE — 87637 SARSCOV2&INF A&B&RSV AMP PRB: CPT | Mod: QW

## 2023-10-24 PROCEDURE — 87651 STREP A DNA AMP PROBE: CPT

## 2023-10-24 PROCEDURE — 3078F DIAST BP <80 MM HG: CPT

## 2023-10-24 RX ORDER — LIDOCAINE HYDROCHLORIDE 20 MG/ML
15 SOLUTION OROPHARYNGEAL 3 TIMES DAILY PRN
Qty: 150 ML | Refills: 0 | Status: SHIPPED | OUTPATIENT
Start: 2023-10-24

## 2023-10-24 RX ORDER — AMOXICILLIN 500 MG/1
500 CAPSULE ORAL 2 TIMES DAILY
Qty: 20 CAPSULE | Refills: 0 | Status: SHIPPED | OUTPATIENT
Start: 2023-10-24 | End: 2023-11-03

## 2023-10-24 ASSESSMENT — FIBROSIS 4 INDEX: FIB4 SCORE: 0.63

## 2023-10-24 NOTE — LETTER
October 24, 2023    To Whom It May Concern:         This is confirmation that Gina Padilla attended her scheduled appointment with AZEB Hernandez on 10/24/23.    She may return to work on 10/26/23.         If you have any questions please do not hesitate to call me at the phone number listed below.    Sincerely,          LYN Hernandez.  [phone]

## 2023-10-24 NOTE — PROGRESS NOTES
Subjective:   Gina Padilla is a 38 y.o. female who presents for Body Aches, Chills, Fever, Pharyngitis (Sx 3 days), Headache, and Diarrhea      HPI:    Patient presents to urgent care with concerns of sore throat and body aches x 3 days.  She is also experiencing fever (subjective), chills, headache, diarrhea.  Denies rhinorrhea, nasal congestion, cough.  No nausea, vomiting.  Solids are difficult to swallow due to pain.   Tolerating some fluids but is likely not drinking enough water  States it feels like glass when she swallows      ROS As above in HPI    Medications:    Current Outpatient Medications on File Prior to Visit   Medication Sig Dispense Refill    buPROPion SR (WELLBUTRIN-SR) 150 MG TABLET SR 12 HR sustained-release tablet Take 150 mg by mouth 2 times a day.      lisinopril-hydrochlorothiazide (PRINZIDE) 20-25 MG per tablet       benzonatate (TESSALON) 100 MG Cap Take 1 Capsule by mouth 3 times a day as needed for Cough. (Patient not taking: Reported on 4/13/2023) 60 Capsule 0    promethazine-dextromethorphan (PROMETHAZINE-DM) 6.25-15 MG/5ML syrup Take 5 mL by mouth at bedtime as needed for Cough. (Patient not taking: Reported on 4/13/2023) 120 mL 0     No current facility-administered medications on file prior to visit.        Allergies:   Patient has no known allergies.    Problem List:   There is no problem list on file for this patient.       Surgical History:  Past Surgical History:   Procedure Laterality Date    OTHER ORTHOPEDIC SURGERY      right ankle       Past Social Hx:   Social History     Tobacco Use    Smoking status: Every Day     Current packs/day: 0.25     Types: Cigarettes    Smokeless tobacco: Never    Tobacco comments:     1/2 ppd   Vaping Use    Vaping Use: Never used   Substance Use Topics    Alcohol use: Yes     Comment: Occasionally    Drug use: No          Problem list, medications, and allergies reviewed by myself today in Epic.     Objective:     /68    "Pulse (!) 104   Temp 36.9 °C (98.4 °F) (Temporal)   Resp 15   Ht 1.6 m (5' 3\")   Wt 101 kg (222 lb)   SpO2 99%   BMI 39.33 kg/m²     Physical Exam  Vitals and nursing note reviewed.   Constitutional:       Appearance: Normal appearance.   HENT:      Head: Normocephalic and atraumatic.      Right Ear: Tympanic membrane and ear canal normal.      Left Ear: Tympanic membrane and ear canal normal.      Nose: Nose normal.      Mouth/Throat:      Mouth: Mucous membranes are moist.      Pharynx: Uvula midline. Pharyngeal swelling and posterior oropharyngeal erythema present.      Tonsils: No tonsillar exudate. 2+ on the right. 2+ on the left.   Cardiovascular:      Rate and Rhythm: Regular rhythm. Tachycardia present.      Heart sounds: Normal heart sounds. No murmur heard.     No friction rub. No gallop.   Pulmonary:      Effort: Pulmonary effort is normal.      Breath sounds: Normal breath sounds.   Abdominal:      General: Bowel sounds are normal.      Palpations: Abdomen is soft.   Musculoskeletal:      Cervical back: No rigidity or tenderness.   Lymphadenopathy:      Cervical: Cervical adenopathy present.   Skin:     General: Skin is warm and dry.      Capillary Refill: Capillary refill takes less than 2 seconds.      Findings: No rash.   Neurological:      Mental Status: She is alert and oriented to person, place, and time.         Assessment/Plan:       Results for orders placed or performed in visit on 10/24/23   POCT CEPHEID COV-2, FLU A/B, RSV - PCR   Result Value Ref Range    SARS-CoV-2 by PCR Negative Negative, Invalid    Influenza virus A RNA Negative Negative, Invalid    Influenza virus B, PCR Negative Negative, Invalid    RSV, PCR Negative Negative, Invalid   POCT GROUP A STREP, PCR   Result Value Ref Range    POC Group A Strep, PCR Detected (A) Not Detected, Invalid       Diagnosis and associated orders:   1. Fever, unspecified fever cause  - POCT CEPHEID COV-2, FLU A/B, RSV - PCR  - POCT GROUP A " STREP, PCR    2. Strep pharyngitis  - amoxicillin (AMOXIL) 500 MG Cap; Take 1 Capsule by mouth 2 times a day for 10 days.  Dispense: 20 Capsule; Refill: 0  - lidocaine (XYLOCAINE) 2 % Solution; Take 15 mL by mouth 3 times a day as needed for Throat/Mouth Pain.  Dispense: 150 mL; Refill: 0        Comments/MDM:       Rapid strep is positive.  Rx: Amoxil and lidocaine to assist with pain control  Hygiene measures reviewed to prevent coinfection  Supportive measures encouraged: Rest, increased oral hydration, NSAIDs/tylenol as needed per package instructions, lozenges, ice pop, diet as tolerated.  Follow up with PCP advised       Please note that this dictation was created using voice recognition software. I have made a reasonable attempt to correct obvious errors, but I expect that there are errors of grammar and possibly content that I did not discover before finalizing the note.    This note was electronically signed by TANNER Leavitt

## 2023-11-13 ENCOUNTER — OFFICE VISIT (OUTPATIENT)
Dept: URGENT CARE | Facility: CLINIC | Age: 38
End: 2023-11-13
Payer: COMMERCIAL

## 2023-11-13 VITALS
BODY MASS INDEX: 39.84 KG/M2 | RESPIRATION RATE: 18 BRPM | DIASTOLIC BLOOD PRESSURE: 62 MMHG | OXYGEN SATURATION: 93 % | HEART RATE: 108 BPM | WEIGHT: 224.87 LBS | TEMPERATURE: 98.8 F | HEIGHT: 63 IN | SYSTOLIC BLOOD PRESSURE: 98 MMHG

## 2023-11-13 DIAGNOSIS — J02.0 STREP PHARYNGITIS: Primary | ICD-10-CM

## 2023-11-13 PROCEDURE — 3078F DIAST BP <80 MM HG: CPT

## 2023-11-13 PROCEDURE — 87651 STREP A DNA AMP PROBE: CPT

## 2023-11-13 PROCEDURE — 3074F SYST BP LT 130 MM HG: CPT

## 2023-11-13 PROCEDURE — 99213 OFFICE O/P EST LOW 20 MIN: CPT

## 2023-11-13 PROCEDURE — 87637 SARSCOV2&INF A&B&RSV AMP PRB: CPT | Mod: QW

## 2023-11-13 RX ORDER — AMOXICILLIN AND CLAVULANATE POTASSIUM 875; 125 MG/1; MG/1
1 TABLET, FILM COATED ORAL 2 TIMES DAILY
Qty: 20 TABLET | Refills: 0 | Status: SHIPPED | OUTPATIENT
Start: 2023-11-13 | End: 2023-11-23

## 2023-11-13 RX ORDER — DEXAMETHASONE SODIUM PHOSPHATE 10 MG/ML
10 INJECTION INTRAMUSCULAR; INTRAVENOUS ONCE
Status: COMPLETED | OUTPATIENT
Start: 2023-11-13 | End: 2023-11-13

## 2023-11-13 RX ADMIN — DEXAMETHASONE SODIUM PHOSPHATE 10 MG: 10 INJECTION INTRAMUSCULAR; INTRAVENOUS at 13:14

## 2023-11-13 ASSESSMENT — ENCOUNTER SYMPTOMS: SORE THROAT: 0

## 2023-11-13 ASSESSMENT — FIBROSIS 4 INDEX: FIB4 SCORE: 0.63

## 2023-11-13 NOTE — PROGRESS NOTES
Subjective:   Gina Padilla is a 38 y.o. female who presents for Pharyngitis (Congestion, Sore Throat, Intermittent Cough. /)          I introduced myself to the patient and informed them that I am a family nurse practitioner.    HPI: Gina comes in today c/o body aches, chills, sore throat. Onset was 3 weeks ago, she was seen in , dx with strep throat, completed 10 days abx, felt better for a few days but then felt ill again with the above sx.  Patient describes symptoms as constant. They describe the pain as sharp and scratchy sore throat. Aggravating factors include eating, drinking, swallowing. Relieving factors include none. Treatments tried at home include Tylenol with minimal effect. They describe their symptoms as moderate. No known recent exposure to strep or Covid. Smokes 2-3 cigarretes/day.      Review of Systems   Constitutional:  Positive for chills and malaise/fatigue. Negative for fever.   HENT:  Negative for congestion, ear pain and sore throat.    Eyes:  Negative for pain, discharge and redness.   Respiratory:  Positive for cough. Negative for hemoptysis, sputum production, shortness of breath, wheezing and stridor.    Cardiovascular:  Negative for chest pain and palpitations.   Gastrointestinal:  Negative for abdominal pain, diarrhea, nausea and vomiting.   Genitourinary:  Negative for dysuria.   Musculoskeletal:  Negative for myalgias and neck pain.   Skin:  Negative for rash.   Neurological:  Negative for dizziness and headaches.   All other systems reviewed and are negative.      Medications: benzonatate Caps  buPROPion SR Tb12  lidocaine Soln  lisinopril-hydrochlorothiazide  promethazine-dextromethorphan    Allergies: Patient has no known allergies.    Problem List: does not have a problem list on file.    Surgical History:  Past Surgical History:   Procedure Laterality Date    OTHER ORTHOPEDIC SURGERY      right ankle       Past Social Hx:   reports that she has been smoking  "cigarettes. She has never used smokeless tobacco. She reports current alcohol use. She reports that she does not use drugs.     Past Family Hx:   family history is not on file.     Problem list, medications, and allergies reviewed by myself today in Epic.   I have documented what I find to be significant in regards to past medical, social, family and surgical history  in my HPI or under PMH/PSH/FH review section, otherwise it is noncontributory     Objective:     BP 98/62   Pulse (!) 108   Temp 37.1 °C (98.8 °F) (Temporal)   Resp 18   Ht 1.6 m (5' 3\")   Wt 102 kg (224 lb 13.9 oz)   SpO2 93%   BMI 39.83 kg/m²     During this visit, appropriate PPE was worn, and hand hygiene was performed.    Physical Exam  Vitals reviewed.   Constitutional:       General: She is not in acute distress.     Appearance: Normal appearance. She is normal weight. She is not ill-appearing or toxic-appearing.   HENT:      Head: Normocephalic and atraumatic.      Right Ear: External ear normal.      Left Ear: External ear normal.      Nose: No congestion or rhinorrhea.      Mouth/Throat:      Pharynx: Uvula midline. Oropharyngeal exudate and posterior oropharyngeal erythema present. No uvula swelling.      Tonsils: Tonsillar exudate present. No tonsillar abscesses. 3+ on the right. 3+ on the left.      Comments: Tonsillar swelling 3+  bilaterally.  No soft tissue swelling of the sublingual mucosa, no petechia or swelling of the soft or hard palate, no unilarteral oropharynx swelling, no sign of tonsillar stone, epiglottitis, or abscess.  Airway is patent and there is no stridor.  Patient is managing oral secretions appropriately.  Uvula is midline and appropriate size with no erythema or edema.    Eyes:      General: No scleral icterus.        Right eye: No discharge.         Left eye: No discharge.      Extraocular Movements: Extraocular movements intact.      Conjunctiva/sclera: Conjunctivae normal.   Cardiovascular:      Rate and " Rhythm: Normal rate.   Pulmonary:      Effort: Pulmonary effort is normal.   Abdominal:      General: There is no distension.   Genitourinary:     Comments: Deferred  Musculoskeletal:         General: No swelling or tenderness. Normal range of motion.      Right lower leg: No edema.      Left lower leg: No edema.   Skin:     General: Skin is warm and dry.   Neurological:      General: No focal deficit present.      Mental Status: She is alert and oriented to person, place, and time. Mental status is at baseline.   Psychiatric:         Mood and Affect: Mood normal.         Behavior: Behavior normal.       Lab Results/POC Test Results   Results for orders placed or performed in visit on 11/13/23   POCT Cepheid CoV-2, Flu A/B, RSV - PCR   Result Value Ref Range    SARS-CoV-2 by PCR Negative Negative, Invalid    Influenza virus A RNA Negative Negative, Invalid    Influenza virus B, PCR Negative Negative, Invalid    RSV, PCR Negative Negative, Invalid   POCT Cepheid Group A Strep - PCR   Result Value Ref Range    POC Group A Strep, PCR Detected (A) Not Detected, Invalid             Assessment/Plan:     Diagnosis and associated orders:      Comments/MDM:     1. Strep pharyngitis   Strep PCR is positive.  Possible reinfection or antibiotic treatment failure from her previous strep throat infection of 10/24/2023.  I did notify the patient and discussed with her we will put her on antibiotics again, this time Augmentin twice a day for 10 days to cover possible beta-lactamase producing organisms.  I discussed with her Decadron, purpose, side effects, precautions, she wishes to go ahead with a dose in clinic today.  I did hold for 10 minutes after the dose, she tolerated well with no adverse reactions.  We did discuss the following strep precautions-   -no sharing of drinks of water bottles, change toothbrush and pillowcases after 48 hours of antibiotics to prevent reinfection  - generally after 48 hours of antibiotics no  longer infectious, may return to work.   -May use Flonase nasal spray to help with the congestion.   Drink plenty of fluids   -I also advised them to use a humidifier in their room at night to help with sleep, and to gargle with salt water, OTC Cepacol lozenges as needed to help soothe the sore throat.    - I did print out written information regarding strep pharyngitis and all medications prescribed, and I did go over these with the patient.   We discussed red flags and when to return to care versus when to go to the emergency room.  -They state they understand all instructions and and are agreeable with the plan of care.    - POCT Cepheid CoV-2, Flu A/B, RSV - PCR  - POCT Cepheid Group A Strep - PCR  - dexamethasone (Decadron) injection (check route below) 10 mg  - amoxicillin-clavulanate (AUGMENTIN) 875-125 MG Tab; Take 1 Tablet by mouth 2 times a day for 10 days.  Dispense: 20 Tablet; Refill: 0         Pt is clinically stable at today's acute urgent care visit. Vital signs are normal and reassuring.  No acute distress noted. Appropriate for outpatient management at this time.       Discussed DDx, management options (risks,benefits, and alternatives to planned treatment), natural progression and supportive care.  Patient states they have good understanding and the treatment plan was agreed upon. Questions were encouraged and answered   Return to urgent care prn if new or worsening sx or if there is no improvement in condition prn.    Advised the patient to follow-up with the primary care physician for recheck, reevaluation, and consideration of further management.  I instructed patient to get a pharmacy consult when picking up any prescribed medications.  Strict ER precautions discussed for any  chest pain, difficulty breathing, difficulty swallowing, wheezing, stridor, or drooling, fever that does not respond to ibuprofen and Tylenol, inability to tolerate fluids, dehydration, lethargy.   Patient states they  understand all instructions.     I personally reviewed prior external notes and test results pertinent to today's visit.  I have independently reviewed and interpreted all diagnostics ordered during this urgent care acute visit.        Please note that this dictation was created using voice recognition software. I have made a reasonable attempt to correct obvious errors, but I expect that there are errors of grammar and possibly content that I did not discover before finalizing the note.    This note was electronically signed by Jeffry HART, LEANDRA, DELL, MONSTER

## 2023-11-13 NOTE — LETTER
November 13, 2023    To Whom It May Concern:         This is confirmation that Gina Padilla attended her scheduled appointment with AZEB Cowan on 11/13/23.  She is medically excused from work due to illness from 11/13/2023 through 11/15/2023.  She may return to work on 11/16/2023.         If you have any questions please do not hesitate to call me at the phone number listed below.    Sincerely,          LYN Cowan.  [phone]

## 2023-11-14 ASSESSMENT — ENCOUNTER SYMPTOMS
ABDOMINAL PAIN: 0
SPUTUM PRODUCTION: 0
DIARRHEA: 0
MYALGIAS: 0
EYE DISCHARGE: 0
COUGH: 1
HEADACHES: 0
FEVER: 0
STRIDOR: 0
PALPITATIONS: 0
EYE REDNESS: 0
EYE PAIN: 0
DIZZINESS: 0
NECK PAIN: 0
CHILLS: 1
WHEEZING: 0
VOMITING: 0
NAUSEA: 0
HEMOPTYSIS: 0
SHORTNESS OF BREATH: 0

## 2025-02-19 ENCOUNTER — APPOINTMENT (OUTPATIENT)
Dept: RADIOLOGY | Facility: MEDICAL CENTER | Age: 40
End: 2025-02-19
Attending: INTERNAL MEDICINE
Payer: COMMERCIAL

## 2025-02-19 DIAGNOSIS — Z12.31 VISIT FOR SCREENING MAMMOGRAM: ICD-10-CM

## 2025-02-19 PROCEDURE — 77067 SCR MAMMO BI INCL CAD: CPT

## 2025-02-22 ENCOUNTER — RESULTS FOLLOW-UP (OUTPATIENT)
Dept: MEDICAL GROUP | Facility: LAB | Age: 40
End: 2025-02-22